# Patient Record
Sex: MALE | Race: ASIAN | NOT HISPANIC OR LATINO | URBAN - METROPOLITAN AREA
[De-identification: names, ages, dates, MRNs, and addresses within clinical notes are randomized per-mention and may not be internally consistent; named-entity substitution may affect disease eponyms.]

---

## 2017-01-24 PROBLEM — Z00.00 ENCOUNTER FOR PREVENTIVE HEALTH EXAMINATION: Status: ACTIVE | Noted: 2017-01-24

## 2017-01-25 ENCOUNTER — OUTPATIENT (OUTPATIENT)
Dept: OUTPATIENT SERVICES | Facility: HOSPITAL | Age: 77
LOS: 1 days | End: 2017-01-25
Payer: COMMERCIAL

## 2017-01-25 DIAGNOSIS — I25.10 ATHEROSCLEROTIC HEART DISEASE OF NATIVE CORONARY ARTERY WITHOUT ANGINA PECTORIS: ICD-10-CM

## 2017-01-25 PROCEDURE — A9502: CPT

## 2017-01-25 PROCEDURE — 93017 CV STRESS TEST TRACING ONLY: CPT

## 2017-01-25 PROCEDURE — 78452 HT MUSCLE IMAGE SPECT MULT: CPT

## 2017-07-30 ENCOUNTER — EMERGENCY (EMERGENCY)
Facility: HOSPITAL | Age: 77
LOS: 1 days | Discharge: HOME | End: 2017-07-30
Attending: EMERGENCY MEDICINE
Payer: COMMERCIAL

## 2017-07-30 VITALS
HEART RATE: 76 BPM | OXYGEN SATURATION: 99 % | TEMPERATURE: 97 F | DIASTOLIC BLOOD PRESSURE: 97 MMHG | SYSTOLIC BLOOD PRESSURE: 176 MMHG | HEIGHT: 67 IN | WEIGHT: 169.98 LBS | RESPIRATION RATE: 18 BRPM

## 2017-07-30 VITALS
SYSTOLIC BLOOD PRESSURE: 182 MMHG | HEART RATE: 62 BPM | RESPIRATION RATE: 18 BRPM | TEMPERATURE: 98 F | DIASTOLIC BLOOD PRESSURE: 75 MMHG | OXYGEN SATURATION: 97 %

## 2017-07-30 DIAGNOSIS — K43.9 VENTRAL HERNIA WITHOUT OBSTRUCTION OR GANGRENE: ICD-10-CM

## 2017-07-30 DIAGNOSIS — N18.9 CHRONIC KIDNEY DISEASE, UNSPECIFIED: ICD-10-CM

## 2017-07-30 DIAGNOSIS — E87.6 HYPOKALEMIA: ICD-10-CM

## 2017-07-30 DIAGNOSIS — M19.90 UNSPECIFIED OSTEOARTHRITIS, UNSPECIFIED SITE: ICD-10-CM

## 2017-07-30 DIAGNOSIS — Z98.61 CORONARY ANGIOPLASTY STATUS: ICD-10-CM

## 2017-07-30 DIAGNOSIS — I12.9 HYPERTENSIVE CHRONIC KIDNEY DISEASE WITH STAGE 1 THROUGH STAGE 4 CHRONIC KIDNEY DISEASE, OR UNSPECIFIED CHRONIC KIDNEY DISEASE: ICD-10-CM

## 2017-07-30 DIAGNOSIS — Z95.5 PRESENCE OF CORONARY ANGIOPLASTY IMPLANT AND GRAFT: Chronic | ICD-10-CM

## 2017-07-30 DIAGNOSIS — E78.00 PURE HYPERCHOLESTEROLEMIA, UNSPECIFIED: ICD-10-CM

## 2017-07-30 PROCEDURE — 93005 ELECTROCARDIOGRAM TRACING: CPT

## 2017-07-30 PROCEDURE — 70450 CT HEAD/BRAIN W/O DYE: CPT | Mod: 26

## 2017-07-30 PROCEDURE — 70450 CT HEAD/BRAIN W/O DYE: CPT

## 2017-07-30 PROCEDURE — 99284 EMERGENCY DEPT VISIT MOD MDM: CPT | Mod: 25

## 2017-07-30 PROCEDURE — 99285 EMERGENCY DEPT VISIT HI MDM: CPT

## 2017-07-30 RX ORDER — ACETAMINOPHEN 500 MG
975 TABLET ORAL ONCE
Qty: 0 | Refills: 0 | Status: COMPLETED | OUTPATIENT
Start: 2017-07-30 | End: 2017-07-30

## 2017-07-30 RX ADMIN — Medication 975 MILLIGRAM(S): at 15:28

## 2017-07-30 NOTE — ED PROVIDER NOTE - PMH
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

## 2017-07-30 NOTE — ED PROVIDER NOTE - OBJECTIVE STATEMENT
78 y/o M pt with PMHx of Chronic Hypokalemia, CKD, Osteoarthritis, HTN, Hypercholesterolemia, and Ventral Hernia and PSHx of Hemorrhoidectomy and Coronary Artery Stent Placement presents to ED c/p high blood pressure today. Pt also reports associated HA described as pain over L posterior head. Pt states having blood pressure of 175 (systolic) earlier today, followed by blood pressure of 220 (systolic) upon recheck several hours PTA in ED, prompting ED visit. Pt notes taking blood pressure medication today; pt is unaware of what blood pressure medication he takes. Pt denies CP, SOB, or any other complaints. Pt also denies Hx of DM. NKDA.

## 2017-07-30 NOTE — ED PROVIDER NOTE - MEDICAL DECISION MAKING DETAILS
78 y/o M pt presents with hypertension and L posterior head pain. Plan to give Tylenol, obtain CT Head, and d/c home with instructions for neurology and PMD f/u for further evaluation of chronic memory loss.

## 2018-03-05 ENCOUNTER — APPOINTMENT (OUTPATIENT)
Dept: HOME HEALTH SERVICES | Facility: HOME HEALTH | Age: 78
End: 2018-03-05
Payer: MEDICARE

## 2018-03-05 VITALS
RESPIRATION RATE: 16 BRPM | SYSTOLIC BLOOD PRESSURE: 110 MMHG | DIASTOLIC BLOOD PRESSURE: 70 MMHG | OXYGEN SATURATION: 97 % | HEART RATE: 75 BPM

## 2018-03-05 VITALS — BODY MASS INDEX: 26.68 KG/M2 | HEIGHT: 67 IN | WEIGHT: 170 LBS

## 2018-03-05 DIAGNOSIS — K43.9 VENTRAL HERNIA W/OUT OBSTRUCTION OR GANGRENE: ICD-10-CM

## 2018-03-05 DIAGNOSIS — M19.90 UNSPECIFIED OSTEOARTHRITIS, UNSPECIFIED SITE: ICD-10-CM

## 2018-03-05 DIAGNOSIS — Z78.9 OTHER SPECIFIED HEALTH STATUS: ICD-10-CM

## 2018-03-05 DIAGNOSIS — Z87.891 PERSONAL HISTORY OF NICOTINE DEPENDENCE: ICD-10-CM

## 2018-03-05 PROCEDURE — 99345 HOME/RES VST NEW HIGH MDM 75: CPT | Mod: 25

## 2018-03-05 PROCEDURE — G0506: CPT

## 2018-03-05 RX ORDER — HYDRALAZINE HYDROCHLORIDE 25 MG/1
25 TABLET ORAL
Qty: 90 | Refills: 0 | Status: COMPLETED | COMMUNITY
Start: 2017-12-01 | End: 2018-03-05

## 2018-03-05 RX ORDER — RIVASTIGMINE 4.6 MG/24H
4.6 PATCH, EXTENDED RELEASE TRANSDERMAL
Qty: 30 | Refills: 0 | Status: COMPLETED | COMMUNITY
Start: 2017-08-23 | End: 2018-03-05

## 2018-03-05 RX ORDER — RIVASTIGMINE 4.6 MG/24H
4.6 PATCH, EXTENDED RELEASE TRANSDERMAL
Refills: 0 | Status: ACTIVE | COMMUNITY
Start: 2018-03-05

## 2018-03-05 RX ORDER — HYDRALAZINE HYDROCHLORIDE 50 MG/1
50 TABLET ORAL
Qty: 90 | Refills: 0 | Status: COMPLETED | COMMUNITY
Start: 2017-08-03

## 2018-03-13 LAB
ALBUMIN SERPL ELPH-MCNC: 3.9 G/DL
ALP BLD-CCNC: 54 U/L
ALT SERPL-CCNC: 17 U/L
ANION GAP SERPL CALC-SCNC: 14 MMOL/L
AST SERPL-CCNC: 27 U/L
BASOPHILS # BLD AUTO: 0.04 K/UL
BASOPHILS NFR BLD AUTO: 0.6 %
BILIRUB SERPL-MCNC: 0.6 MG/DL
BUN SERPL-MCNC: 12 MG/DL
CALCIUM SERPL-MCNC: 9 MG/DL
CHLORIDE SERPL-SCNC: 104 MMOL/L
CHOLEST SERPL-MCNC: 218 MG/DL
CHOLEST/HDLC SERPL: 5.2 RATIO
CO2 SERPL-SCNC: 26 MMOL/L
CREAT SERPL-MCNC: 1.49 MG/DL
EOSINOPHIL # BLD AUTO: 0.2 K/UL
EOSINOPHIL NFR BLD AUTO: 2.8 %
HCT VFR BLD CALC: 44.3 %
HDLC SERPL-MCNC: 42 MG/DL
HGB BLD-MCNC: 14.8 G/DL
IMM GRANULOCYTES NFR BLD AUTO: 0.1 %
LDLC SERPL CALC-MCNC: 145 MG/DL
LYMPHOCYTES # BLD AUTO: 2.2 K/UL
LYMPHOCYTES NFR BLD AUTO: 31.1 %
MAN DIFF?: NORMAL
MCHC RBC-ENTMCNC: 32 PG
MCHC RBC-ENTMCNC: 33.4 GM/DL
MCV RBC AUTO: 95.7 FL
MONOCYTES # BLD AUTO: 0.66 K/UL
MONOCYTES NFR BLD AUTO: 9.3 %
NEUTROPHILS # BLD AUTO: 3.96 K/UL
NEUTROPHILS NFR BLD AUTO: 56.1 %
PLATELET # BLD AUTO: 226 K/UL
POTASSIUM SERPL-SCNC: 3.8 MMOL/L
PROT SERPL-MCNC: 7.4 G/DL
RBC # BLD: 4.63 M/UL
RBC # FLD: 12.6 %
SODIUM SERPL-SCNC: 144 MMOL/L
TRIGL SERPL-MCNC: 153 MG/DL
TSH SERPL-ACNC: 3.38 UIU/ML
WBC # FLD AUTO: 7.07 K/UL

## 2018-03-15 LAB — 25(OH)D3 SERPL-MCNC: 22.3 NG/ML

## 2018-04-09 ENCOUNTER — CHART COPY (OUTPATIENT)
Age: 78
End: 2018-04-09

## 2018-05-07 ENCOUNTER — APPOINTMENT (OUTPATIENT)
Dept: HOME HEALTH SERVICES | Facility: HOME HEALTH | Age: 78
End: 2018-05-07
Payer: MEDICARE

## 2018-05-07 VITALS
DIASTOLIC BLOOD PRESSURE: 70 MMHG | OXYGEN SATURATION: 92 % | RESPIRATION RATE: 16 BRPM | HEART RATE: 65 BPM | SYSTOLIC BLOOD PRESSURE: 150 MMHG

## 2018-05-07 DIAGNOSIS — N18.9 CHRONIC KIDNEY DISEASE, UNSPECIFIED: ICD-10-CM

## 2018-05-07 PROCEDURE — 99350 HOME/RES VST EST HIGH MDM 60: CPT

## 2018-05-20 ENCOUNTER — CLINICAL ADVICE (OUTPATIENT)
Age: 78
End: 2018-05-20

## 2018-05-20 DIAGNOSIS — M25.473 EFFUSION, UNSPECIFIED ANKLE: ICD-10-CM

## 2018-07-11 ENCOUNTER — INPATIENT (INPATIENT)
Facility: HOSPITAL | Age: 78
LOS: 3 days | Discharge: HOME | End: 2018-07-15
Attending: HOSPITALIST | Admitting: HOSPITALIST
Payer: MEDICARE

## 2018-07-11 VITALS
HEART RATE: 105 BPM | DIASTOLIC BLOOD PRESSURE: 87 MMHG | SYSTOLIC BLOOD PRESSURE: 150 MMHG | RESPIRATION RATE: 18 BRPM | OXYGEN SATURATION: 96 % | TEMPERATURE: 101 F

## 2018-07-11 DIAGNOSIS — Z95.5 PRESENCE OF CORONARY ANGIOPLASTY IMPLANT AND GRAFT: Chronic | ICD-10-CM

## 2018-07-11 LAB
ALBUMIN SERPL ELPH-MCNC: 4 G/DL — SIGNIFICANT CHANGE UP (ref 3.3–5)
ALP SERPL-CCNC: 78 U/L — SIGNIFICANT CHANGE UP (ref 40–120)
ALT FLD-CCNC: 78 U/L — HIGH (ref 4–41)
ANISOCYTOSIS BLD QL: SLIGHT — SIGNIFICANT CHANGE UP
APPEARANCE UR: CLEAR — SIGNIFICANT CHANGE UP
AST SERPL-CCNC: 91 U/L — HIGH (ref 4–40)
BASE EXCESS BLDV CALC-SCNC: 3.9 MMOL/L — SIGNIFICANT CHANGE UP
BASOPHILS # BLD AUTO: 0.05 K/UL — SIGNIFICANT CHANGE UP (ref 0–0.2)
BASOPHILS NFR BLD AUTO: 0.3 % — SIGNIFICANT CHANGE UP (ref 0–2)
BASOPHILS NFR SPEC: 0 % — SIGNIFICANT CHANGE UP (ref 0–2)
BILIRUB SERPL-MCNC: 1.6 MG/DL — HIGH (ref 0.2–1.2)
BILIRUB UR-MCNC: NEGATIVE — SIGNIFICANT CHANGE UP
BLASTS # FLD: 0 % — SIGNIFICANT CHANGE UP (ref 0–0)
BLOOD GAS VENOUS - CREATININE: 2.29 MG/DL — HIGH (ref 0.5–1.3)
BLOOD UR QL VISUAL: HIGH
BUN SERPL-MCNC: 17 MG/DL — SIGNIFICANT CHANGE UP (ref 7–23)
CALCIUM SERPL-MCNC: 8.8 MG/DL — SIGNIFICANT CHANGE UP (ref 8.4–10.5)
CHLORIDE BLDV-SCNC: 104 MMOL/L — SIGNIFICANT CHANGE UP (ref 96–108)
CHLORIDE SERPL-SCNC: 100 MMOL/L — SIGNIFICANT CHANGE UP (ref 98–107)
CO2 SERPL-SCNC: 26 MMOL/L — SIGNIFICANT CHANGE UP (ref 22–31)
COLOR SPEC: YELLOW — SIGNIFICANT CHANGE UP
CREAT SERPL-MCNC: 2.21 MG/DL — HIGH (ref 0.5–1.3)
EOSINOPHIL # BLD AUTO: 0 K/UL — SIGNIFICANT CHANGE UP (ref 0–0.5)
EOSINOPHIL NFR BLD AUTO: 0 % — SIGNIFICANT CHANGE UP (ref 0–6)
EOSINOPHIL NFR FLD: 0 % — SIGNIFICANT CHANGE UP (ref 0–6)
GAS PNL BLDV: 136 MMOL/L — SIGNIFICANT CHANGE UP (ref 136–146)
GLUCOSE BLDV-MCNC: 135 — HIGH (ref 70–99)
GLUCOSE SERPL-MCNC: 131 MG/DL — HIGH (ref 70–99)
GLUCOSE UR-MCNC: NEGATIVE — SIGNIFICANT CHANGE UP
HCO3 BLDV-SCNC: 26 MMOL/L — SIGNIFICANT CHANGE UP (ref 20–27)
HCT VFR BLD CALC: 41.7 % — SIGNIFICANT CHANGE UP (ref 39–50)
HCT VFR BLDV CALC: 46.8 % — SIGNIFICANT CHANGE UP (ref 39–51)
HGB BLD-MCNC: 14.6 G/DL — SIGNIFICANT CHANGE UP (ref 13–17)
HGB BLDV-MCNC: 15.3 G/DL — SIGNIFICANT CHANGE UP (ref 13–17)
IMM GRANULOCYTES # BLD AUTO: 0.12 # — SIGNIFICANT CHANGE UP
IMM GRANULOCYTES NFR BLD AUTO: 0.8 % — SIGNIFICANT CHANGE UP (ref 0–1.5)
KETONES UR-MCNC: NEGATIVE — SIGNIFICANT CHANGE UP
LACTATE BLDV-MCNC: 1.9 MMOL/L — SIGNIFICANT CHANGE UP (ref 0.5–2)
LEUKOCYTE ESTERASE UR-ACNC: NEGATIVE — SIGNIFICANT CHANGE UP
LYMPHOCYTES # BLD AUTO: 1.7 K/UL — SIGNIFICANT CHANGE UP (ref 1–3.3)
LYMPHOCYTES # BLD AUTO: 11.8 % — LOW (ref 13–44)
LYMPHOCYTES NFR SPEC AUTO: 12.4 % — LOW (ref 13–44)
MCHC RBC-ENTMCNC: 30.8 PG — SIGNIFICANT CHANGE UP (ref 27–34)
MCHC RBC-ENTMCNC: 35 % — SIGNIFICANT CHANGE UP (ref 32–36)
MCV RBC AUTO: 88 FL — SIGNIFICANT CHANGE UP (ref 80–100)
METAMYELOCYTES # FLD: 0 % — SIGNIFICANT CHANGE UP (ref 0–1)
MICROCYTES BLD QL: SLIGHT — SIGNIFICANT CHANGE UP
MONOCYTES # BLD AUTO: 0.95 K/UL — HIGH (ref 0–0.9)
MONOCYTES NFR BLD AUTO: 6.6 % — SIGNIFICANT CHANGE UP (ref 2–14)
MONOCYTES NFR BLD: 1.8 % — LOW (ref 2–9)
MUCOUS THREADS # UR AUTO: SIGNIFICANT CHANGE UP
MYELOCYTES NFR BLD: 0 % — SIGNIFICANT CHANGE UP (ref 0–0)
NEUTROPHIL AB SER-ACNC: 75.2 % — SIGNIFICANT CHANGE UP (ref 43–77)
NEUTROPHILS # BLD AUTO: 11.55 K/UL — HIGH (ref 1.8–7.4)
NEUTROPHILS NFR BLD AUTO: 80.5 % — HIGH (ref 43–77)
NEUTS BAND # BLD: 7.1 % — HIGH (ref 0–6)
NITRITE UR-MCNC: NEGATIVE — SIGNIFICANT CHANGE UP
NRBC # FLD: 0 — SIGNIFICANT CHANGE UP
OTHER - HEMATOLOGY %: 0 — SIGNIFICANT CHANGE UP
OVALOCYTES BLD QL SMEAR: SLIGHT — SIGNIFICANT CHANGE UP
PCO2 BLDV: 44 MMHG — SIGNIFICANT CHANGE UP (ref 41–51)
PH BLDV: 7.42 PH — SIGNIFICANT CHANGE UP (ref 7.32–7.43)
PH UR: 7.5 — SIGNIFICANT CHANGE UP (ref 4.6–8)
PLATELET # BLD AUTO: 186 K/UL — SIGNIFICANT CHANGE UP (ref 150–400)
PLATELET COUNT - ESTIMATE: NORMAL — SIGNIFICANT CHANGE UP
PMV BLD: 9 FL — SIGNIFICANT CHANGE UP (ref 7–13)
PO2 BLDV: 26 MMHG — LOW (ref 35–40)
POIKILOCYTOSIS BLD QL AUTO: SLIGHT — SIGNIFICANT CHANGE UP
POTASSIUM BLDV-SCNC: 3.2 MMOL/L — LOW (ref 3.4–4.5)
POTASSIUM SERPL-MCNC: 3.3 MMOL/L — LOW (ref 3.5–5.3)
POTASSIUM SERPL-SCNC: 3.3 MMOL/L — LOW (ref 3.5–5.3)
PROMYELOCYTES # FLD: 0 % — SIGNIFICANT CHANGE UP (ref 0–0)
PROT SERPL-MCNC: 8.3 G/DL — SIGNIFICANT CHANGE UP (ref 6–8.3)
PROT UR-MCNC: 100 MG/DL — SIGNIFICANT CHANGE UP
RBC # BLD: 4.74 M/UL — SIGNIFICANT CHANGE UP (ref 4.2–5.8)
RBC # FLD: 12.4 % — SIGNIFICANT CHANGE UP (ref 10.3–14.5)
RBC CASTS # UR COMP ASSIST: HIGH (ref 0–?)
SAO2 % BLDV: 47.2 % — LOW (ref 60–85)
SODIUM SERPL-SCNC: 140 MMOL/L — SIGNIFICANT CHANGE UP (ref 135–145)
SP GR SPEC: 1.01 — SIGNIFICANT CHANGE UP (ref 1–1.04)
UROBILINOGEN FLD QL: NORMAL MG/DL — SIGNIFICANT CHANGE UP
VARIANT LYMPHS # BLD: 3.5 % — SIGNIFICANT CHANGE UP
WBC # BLD: 14.37 K/UL — HIGH (ref 3.8–10.5)
WBC # FLD AUTO: 14.37 K/UL — HIGH (ref 3.8–10.5)
WBC UR QL: SIGNIFICANT CHANGE UP (ref 0–?)

## 2018-07-11 PROCEDURE — 93971 EXTREMITY STUDY: CPT | Mod: 26,LT

## 2018-07-11 RX ORDER — VANCOMYCIN HCL 1 G
1000 VIAL (EA) INTRAVENOUS ONCE
Qty: 0 | Refills: 0 | Status: COMPLETED | OUTPATIENT
Start: 2018-07-11 | End: 2018-07-12

## 2018-07-11 RX ORDER — SODIUM CHLORIDE 9 MG/ML
1000 INJECTION INTRAMUSCULAR; INTRAVENOUS; SUBCUTANEOUS ONCE
Qty: 0 | Refills: 0 | Status: COMPLETED | OUTPATIENT
Start: 2018-07-11 | End: 2018-07-11

## 2018-07-11 RX ORDER — PIPERACILLIN AND TAZOBACTAM 4; .5 G/20ML; G/20ML
3.38 INJECTION, POWDER, LYOPHILIZED, FOR SOLUTION INTRAVENOUS ONCE
Qty: 0 | Refills: 0 | Status: COMPLETED | OUTPATIENT
Start: 2018-07-11 | End: 2018-07-11

## 2018-07-11 RX ORDER — ACETAMINOPHEN 500 MG
975 TABLET ORAL ONCE
Qty: 0 | Refills: 0 | Status: COMPLETED | OUTPATIENT
Start: 2018-07-11 | End: 2018-07-11

## 2018-07-11 RX ADMIN — PIPERACILLIN AND TAZOBACTAM 200 GRAM(S): 4; .5 INJECTION, POWDER, LYOPHILIZED, FOR SOLUTION INTRAVENOUS at 22:06

## 2018-07-11 RX ADMIN — SODIUM CHLORIDE 1000 MILLILITER(S): 9 INJECTION INTRAMUSCULAR; INTRAVENOUS; SUBCUTANEOUS at 21:52

## 2018-07-11 RX ADMIN — Medication 975 MILLIGRAM(S): at 22:07

## 2018-07-11 NOTE — ED ADULT TRIAGE NOTE - CHIEF COMPLAINT QUOTE
Pt was brought in by family for co weakness fever and chills that developed last night. pts family also report pt unable o hold urine. Pt was brought in by family for co weakness fever and chills that developed last night. pts family also report pt unable o hold urine, pt urinated on himself in triage.

## 2018-07-11 NOTE — ED CLERICAL - NS ED CLERK NOTE PRE-ARRIVAL INFORMATION; ADDITIONAL PRE-ARRIVAL INFORMATION

## 2018-07-11 NOTE — ED ADULT NURSE NOTE - CHIEF COMPLAINT QUOTE
Pt was brought in by family for co weakness fever and chills that developed last night. pts family also report pt unable o hold urine, pt urinated on himself in triage.

## 2018-07-11 NOTE — ED ADULT NURSE NOTE - OBJECTIVE STATEMENT
Patience RN: pt received to the ed w/hx of alzheimer's, and 3 stents presents w/ c/o fever and chills that began earlier today. pt normally a&ox2 at baseline to time and person, skin intact, respirations even and unlabored, abd soft and non-distended non tender to palpation. as per pt granddaughter, pt has become unsteady on feet recently due to new onset weakness. pt rt leg swollen compared to left-denies being trauma induced. as per granddaughter and daughter at bedside, pt has become incontinent-urinating more frequently. 20 gauge placed in left arm, labs drawn and sent, pt nsr on cardiac monitor, will report to primary RN Opal.

## 2018-07-12 ENCOUNTER — APPOINTMENT (OUTPATIENT)
Dept: HOME HEALTH SERVICES | Facility: HOME HEALTH | Age: 78
End: 2018-07-12

## 2018-07-12 DIAGNOSIS — E78.5 HYPERLIPIDEMIA, UNSPECIFIED: ICD-10-CM

## 2018-07-12 DIAGNOSIS — J18.9 PNEUMONIA, UNSPECIFIED ORGANISM: ICD-10-CM

## 2018-07-12 DIAGNOSIS — A41.9 SEPSIS, UNSPECIFIED ORGANISM: ICD-10-CM

## 2018-07-12 DIAGNOSIS — G93.41 METABOLIC ENCEPHALOPATHY: ICD-10-CM

## 2018-07-12 DIAGNOSIS — I25.10 ATHEROSCLEROTIC HEART DISEASE OF NATIVE CORONARY ARTERY WITHOUT ANGINA PECTORIS: ICD-10-CM

## 2018-07-12 DIAGNOSIS — N17.8 OTHER ACUTE KIDNEY FAILURE: ICD-10-CM

## 2018-07-12 DIAGNOSIS — R94.5 ABNORMAL RESULTS OF LIVER FUNCTION STUDIES: ICD-10-CM

## 2018-07-12 DIAGNOSIS — L03.90 CELLULITIS, UNSPECIFIED: ICD-10-CM

## 2018-07-12 DIAGNOSIS — N18.9 CHRONIC KIDNEY DISEASE, UNSPECIFIED: ICD-10-CM

## 2018-07-12 DIAGNOSIS — I10 ESSENTIAL (PRIMARY) HYPERTENSION: ICD-10-CM

## 2018-07-12 DIAGNOSIS — Z29.9 ENCOUNTER FOR PROPHYLACTIC MEASURES, UNSPECIFIED: ICD-10-CM

## 2018-07-12 LAB
ALBUMIN SERPL ELPH-MCNC: 3.7 G/DL — SIGNIFICANT CHANGE UP (ref 3.3–5)
ALP SERPL-CCNC: 80 U/L — SIGNIFICANT CHANGE UP (ref 40–120)
ALT FLD-CCNC: 65 U/L — HIGH (ref 4–41)
AST SERPL-CCNC: 68 U/L — HIGH (ref 4–40)
BASOPHILS # BLD AUTO: 0.04 K/UL — SIGNIFICANT CHANGE UP (ref 0–0.2)
BASOPHILS NFR BLD AUTO: 0.3 % — SIGNIFICANT CHANGE UP (ref 0–2)
BILIRUB DIRECT SERPL-MCNC: 0.5 MG/DL — HIGH (ref 0.1–0.2)
BILIRUB SERPL-MCNC: 1.3 MG/DL — HIGH (ref 0.2–1.2)
BUN SERPL-MCNC: 16 MG/DL — SIGNIFICANT CHANGE UP (ref 7–23)
CALCIUM SERPL-MCNC: 8.4 MG/DL — SIGNIFICANT CHANGE UP (ref 8.4–10.5)
CHLORIDE SERPL-SCNC: 100 MMOL/L — SIGNIFICANT CHANGE UP (ref 98–107)
CO2 SERPL-SCNC: 27 MMOL/L — SIGNIFICANT CHANGE UP (ref 22–31)
CREAT SERPL-MCNC: 1.99 MG/DL — HIGH (ref 0.5–1.3)
EOSINOPHIL # BLD AUTO: 0 K/UL — SIGNIFICANT CHANGE UP (ref 0–0.5)
EOSINOPHIL NFR BLD AUTO: 0 % — SIGNIFICANT CHANGE UP (ref 0–6)
GLUCOSE SERPL-MCNC: 116 MG/DL — HIGH (ref 70–99)
HCT VFR BLD CALC: 42.7 % — SIGNIFICANT CHANGE UP (ref 39–50)
HGB BLD-MCNC: 14.7 G/DL — SIGNIFICANT CHANGE UP (ref 13–17)
IMM GRANULOCYTES # BLD AUTO: 0.12 # — SIGNIFICANT CHANGE UP
IMM GRANULOCYTES NFR BLD AUTO: 0.9 % — SIGNIFICANT CHANGE UP (ref 0–1.5)
LYMPHOCYTES # BLD AUTO: 1.5 K/UL — SIGNIFICANT CHANGE UP (ref 1–3.3)
LYMPHOCYTES # BLD AUTO: 10.9 % — LOW (ref 13–44)
MAGNESIUM SERPL-MCNC: 2 MG/DL — SIGNIFICANT CHANGE UP (ref 1.6–2.6)
MCHC RBC-ENTMCNC: 31.9 PG — SIGNIFICANT CHANGE UP (ref 27–34)
MCHC RBC-ENTMCNC: 34.4 % — SIGNIFICANT CHANGE UP (ref 32–36)
MCV RBC AUTO: 92.6 FL — SIGNIFICANT CHANGE UP (ref 80–100)
MONOCYTES # BLD AUTO: 0.72 K/UL — SIGNIFICANT CHANGE UP (ref 0–0.9)
MONOCYTES NFR BLD AUTO: 5.2 % — SIGNIFICANT CHANGE UP (ref 2–14)
NEUTROPHILS # BLD AUTO: 11.4 K/UL — HIGH (ref 1.8–7.4)
NEUTROPHILS NFR BLD AUTO: 82.7 % — HIGH (ref 43–77)
NRBC # FLD: 0 — SIGNIFICANT CHANGE UP
PHOSPHATE SERPL-MCNC: 2.3 MG/DL — LOW (ref 2.5–4.5)
PLATELET # BLD AUTO: 180 K/UL — SIGNIFICANT CHANGE UP (ref 150–400)
PMV BLD: 9.5 FL — SIGNIFICANT CHANGE UP (ref 7–13)
POTASSIUM SERPL-MCNC: 3.2 MMOL/L — LOW (ref 3.5–5.3)
POTASSIUM SERPL-SCNC: 3.2 MMOL/L — LOW (ref 3.5–5.3)
PROT SERPL-MCNC: 7.9 G/DL — SIGNIFICANT CHANGE UP (ref 6–8.3)
RBC # BLD: 4.61 M/UL — SIGNIFICANT CHANGE UP (ref 4.2–5.8)
RBC # FLD: 12.9 % — SIGNIFICANT CHANGE UP (ref 10.3–14.5)
SODIUM SERPL-SCNC: 139 MMOL/L — SIGNIFICANT CHANGE UP (ref 135–145)
SPECIMEN SOURCE: SIGNIFICANT CHANGE UP
SPECIMEN SOURCE: SIGNIFICANT CHANGE UP
WBC # BLD: 13.78 K/UL — HIGH (ref 3.8–10.5)
WBC # FLD AUTO: 13.78 K/UL — HIGH (ref 3.8–10.5)

## 2018-07-12 PROCEDURE — 12345: CPT | Mod: NC

## 2018-07-12 PROCEDURE — 71046 X-RAY EXAM CHEST 2 VIEWS: CPT | Mod: 26

## 2018-07-12 PROCEDURE — 99223 1ST HOSP IP/OBS HIGH 75: CPT

## 2018-07-12 RX ORDER — ASPIRIN/CALCIUM CARB/MAGNESIUM 324 MG
81 TABLET ORAL DAILY
Qty: 0 | Refills: 0 | Status: DISCONTINUED | OUTPATIENT
Start: 2018-07-12 | End: 2018-07-15

## 2018-07-12 RX ORDER — SODIUM CHLORIDE 9 MG/ML
1000 INJECTION INTRAMUSCULAR; INTRAVENOUS; SUBCUTANEOUS
Qty: 0 | Refills: 0 | Status: DISCONTINUED | OUTPATIENT
Start: 2018-07-12 | End: 2018-07-15

## 2018-07-12 RX ORDER — POTASSIUM CHLORIDE 20 MEQ
20 PACKET (EA) ORAL ONCE
Qty: 0 | Refills: 0 | Status: COMPLETED | OUTPATIENT
Start: 2018-07-12 | End: 2018-07-12

## 2018-07-12 RX ORDER — SODIUM CHLORIDE 0.65 %
1 AEROSOL, SPRAY (ML) NASAL EVERY 4 HOURS
Qty: 0 | Refills: 0 | Status: DISCONTINUED | OUTPATIENT
Start: 2018-07-12 | End: 2018-07-15

## 2018-07-12 RX ORDER — SODIUM,POTASSIUM PHOSPHATES 278-250MG
1 POWDER IN PACKET (EA) ORAL
Qty: 0 | Refills: 0 | Status: COMPLETED | OUTPATIENT
Start: 2018-07-12 | End: 2018-07-13

## 2018-07-12 RX ORDER — HEPARIN SODIUM 5000 [USP'U]/ML
5000 INJECTION INTRAVENOUS; SUBCUTANEOUS EVERY 8 HOURS
Qty: 0 | Refills: 0 | Status: DISCONTINUED | OUTPATIENT
Start: 2018-07-12 | End: 2018-07-15

## 2018-07-12 RX ORDER — PIPERACILLIN AND TAZOBACTAM 4; .5 G/20ML; G/20ML
3.38 INJECTION, POWDER, LYOPHILIZED, FOR SOLUTION INTRAVENOUS EVERY 8 HOURS
Qty: 0 | Refills: 0 | Status: DISCONTINUED | OUTPATIENT
Start: 2018-07-12 | End: 2018-07-12

## 2018-07-12 RX ORDER — ASPIRIN/CALCIUM CARB/MAGNESIUM 324 MG
81 TABLET ORAL DAILY
Qty: 0 | Refills: 0 | Status: DISCONTINUED | OUTPATIENT
Start: 2018-07-12 | End: 2018-07-12

## 2018-07-12 RX ORDER — ATORVASTATIN CALCIUM 80 MG/1
40 TABLET, FILM COATED ORAL AT BEDTIME
Qty: 0 | Refills: 0 | Status: DISCONTINUED | OUTPATIENT
Start: 2018-07-12 | End: 2018-07-15

## 2018-07-12 RX ORDER — ACETAMINOPHEN 500 MG
650 TABLET ORAL EVERY 6 HOURS
Qty: 0 | Refills: 0 | Status: DISCONTINUED | OUTPATIENT
Start: 2018-07-12 | End: 2018-07-15

## 2018-07-12 RX ORDER — PIPERACILLIN AND TAZOBACTAM 4; .5 G/20ML; G/20ML
3.38 INJECTION, POWDER, LYOPHILIZED, FOR SOLUTION INTRAVENOUS EVERY 12 HOURS
Qty: 0 | Refills: 0 | Status: DISCONTINUED | OUTPATIENT
Start: 2018-07-12 | End: 2018-07-13

## 2018-07-12 RX ADMIN — PIPERACILLIN AND TAZOBACTAM 25 GRAM(S): 4; .5 INJECTION, POWDER, LYOPHILIZED, FOR SOLUTION INTRAVENOUS at 17:38

## 2018-07-12 RX ADMIN — Medication 20 MILLIEQUIVALENT(S): at 07:56

## 2018-07-12 RX ADMIN — Medication 1 TABLET(S): at 12:15

## 2018-07-12 RX ADMIN — Medication 1 TABLET(S): at 22:20

## 2018-07-12 RX ADMIN — SODIUM CHLORIDE 50 MILLILITER(S): 9 INJECTION INTRAMUSCULAR; INTRAVENOUS; SUBCUTANEOUS at 08:07

## 2018-07-12 RX ADMIN — Medication 1 TABLET(S): at 16:48

## 2018-07-12 RX ADMIN — Medication 250 MILLIGRAM(S): at 00:54

## 2018-07-12 RX ADMIN — HEPARIN SODIUM 5000 UNIT(S): 5000 INJECTION INTRAVENOUS; SUBCUTANEOUS at 22:20

## 2018-07-12 RX ADMIN — Medication 81 MILLIGRAM(S): at 14:17

## 2018-07-12 RX ADMIN — Medication 1 SPRAY(S): at 16:48

## 2018-07-12 RX ADMIN — Medication 20 MILLIEQUIVALENT(S): at 11:17

## 2018-07-12 RX ADMIN — ATORVASTATIN CALCIUM 40 MILLIGRAM(S): 80 TABLET, FILM COATED ORAL at 22:20

## 2018-07-12 NOTE — H&P ADULT - PROBLEM SELECTOR PLAN 4
AST and ALT mildly elevated to ~2x ULN and t bili elevated to 1.6. Alk phos, however, wnl. Unclear etiology. No complaints of abdominal pain and abdominal exam benign.   - Trend LFTs for now. Obtain direct bili vs. indirect bili levels.   - Consider RUQ U/S if LFTs continue to uptrend

## 2018-07-12 NOTE — H&P ADULT - NSHPSOCIALHISTORY_GEN_ALL_CORE
Tobacco: no hx  EtOH: occasional  Illicit drugs: no hx  Lives with wife. Pt independent with most ADLs, ambulates without assistance.

## 2018-07-12 NOTE — ED PROVIDER NOTE - OBJECTIVE STATEMENT
78M w/ pmh CKD, HTN, HLD, CAD s/p stent, dementia p/w weakness, chills since last night; unable to get out of bed. +urinary frequency x 1 day, also RLE swelling and erythema. No cough, sob, dysuria. No recent illness, med change.     FAmily at bedside providing story, pt has hx dementia    PCP: Stacey Corona

## 2018-07-12 NOTE — H&P ADULT - PROBLEM SELECTOR PLAN 8
Cr elevated to 2.21. Unclear if purely CKD or VITOR on CKD. No recent Cr values.  - F/u AM BMP s/p 1L bolus of NS   - Will do gentle IVF with NS at 50 cc/hr x 10 hrs  - Trend Cr and monitor UOP. Avoid NSAIDs, contrast, nephrotoxins. Renally dose meds.

## 2018-07-12 NOTE — H&P ADULT - PROBLEM SELECTOR PLAN 6
BPs in acceptable range.  - Will hold home BP meds (lisinopril 40 mg daily and lasix 20 mg daily) in setting of sepsis and possibly VITOR. Will resume home BP meds as tolerated.

## 2018-07-12 NOTE — H&P ADULT - ASSESSMENT
77 yo man with h/o CAD s/p stents (last one in 2017), HTN, HLD, CKD (unclear Cr baseline), and Alzheimer's dementia (independent with most ADLs but usually not oriented to place) presents with shaking/chills and weakness that started Tuesday night and 1 week of R leg swelling with redness and warmth, admitted with sepsis likely 2/2 multifocal PNA and cellulitis.

## 2018-07-12 NOTE — ED PROVIDER NOTE - PHYSICAL EXAMINATION
*GEN:   comfortable, in no acute distress, AOx3    ///    *EYES:   pupils equally round and reactive to light, extra-occular movements intact    ///    *HEENT:   airway patent, moist mucosal membranes    ///    *CV:   regular rate and rhythm    ///    *RESP:   clear to auscultation bilaterally, non-labored    ///    *ABD:   soft, non-tender    ///    *:   no cva/flank tenderness    ///    *MSK:   no MSK tenderness or limited ROM    ///    *SKIN:   RLE anterolateral calf 5x5cm erythema, minimal tenderness to palpation    ///    *NEURO:   AOx3, no focal weakness or loss of sensation

## 2018-07-12 NOTE — H&P ADULT - PROBLEM SELECTOR PLAN 5
- Unsure if pt is on ASA. Will need to verify with other family members.  - C/w atorvastatin 40 mg qhs

## 2018-07-12 NOTE — H&P ADULT - HISTORY OF PRESENT ILLNESS
77 yo man with h/o CAD s/p stents CAD s/p stents (last one in 2017), HTN, HLD, and Alzheimer's dementia (independent with most ADLs but usually not oriented to place) presents with shaking/chills and weakness that started Tuesday night and 1 week of R leg swelling with redness. HPI obtained from pt's granddaughter, who was at bedside. According to pt's granddaughter, pt had acute onset of dyspnea with shaking/chills on Tuesday night. The following morning, pt was too weak to get out of bed. Pt slept nearly the entire day and had no desire to eat. Pt also very felt hot to touch, though no temp was taken at home. No complaints of CP, cough, wheezing, N/V, diarrhea, abdominal pain, flank pain, or urinary symptoms. Pt had a stuffy nose for a few days leading up to the shaking/chills. Pt's wife was recently sick with PNA. Pt's granddaughter also states that pt has been having 1 week of worsening R leg swelling with redness and warmth. The swelling and redness extends from the distal 1/3 of the shin to the R foot. No history of trauma or bug bites to the area.     In the ED, VS: 101.2 oral, 105, 150/87, 18, 96% RA. CBC significant for leukocytosis of 14.37 with 7.1% bands. Pt was given Vanc and Xosyn by the ED. CXR showed bilateral lower lobe perihilar opacities, possible multifocal PNA. 79 yo man with h/o CAD s/p stents (last one in 2017), HTN, HLD, CKD (unclear Cr baseline), and Alzheimer's dementia (independent with most ADLs but usually not oriented to place) presents with shaking/chills and weakness that started Tuesday night and 1 week of R leg swelling with redness and warmth. HPI obtained from pt's granddaughter, who was at bedside. According to pt's granddaughter, pt had acute onset of dyspnea with shaking/chills on Tuesday night. The following morning, pt was too weak to get out of bed. Pt slept nearly the entire day and had no desire to eat. Pt also very felt hot to touch, though no temp was taken at home. No complaints of CP, cough, wheezing, N/V, diarrhea, abdominal pain, flank pain, or urinary symptoms. Pt had a stuffy nose for a few days leading up to the shaking/chills. Pt's wife was recently sick and hospitalized with PNA. Pt's granddaughter also states that pt has been having 1 week of worsening R leg swelling with redness and warmth. The swelling and redness extends from the distal 1/3 of the shin to the R foot. No history of trauma or bug bites to the area.     In the ED, VS: 101.2 oral, 105, 150/87, 18, 96% RA. CBC significant for leukocytosis of 14.37 with 7.1% bands. Pt was given Vanc and Xosyn by the ED. CXR showed bilateral lower lobe perihilar opacities, possible multifocal PNA.

## 2018-07-12 NOTE — ED PROVIDER NOTE - ATTENDING CONTRIBUTION TO CARE
Dr. Cervantes: 79 yo male with CKD, HTN, HLD, CAD with stent, dementia (family at bedside providing HPI), in ED with generalized fatigue and chills beginning last night.  Also 1 day of urinary frequency.  No cough, N/V/D or abdominal pain.  On exam pt chronically-ill appearing but in NAD, heart RRR, lungs CTAB, abd NTND, strength 5/5 in all extremities and skin without rash.  Right LE distal moderately swollen and warm with mild associated erythema, appears to be early cellulitis.

## 2018-07-12 NOTE — H&P ADULT - NSHPPHYSICALEXAM_GEN_ALL_CORE
Vital Signs Last 24 Hrs  T(C): 37.2 (12 Jul 2018 05:36), Max: 38.4 (11 Jul 2018 19:25)  T(F): 98.9 (12 Jul 2018 05:36), Max: 101.2 (11 Jul 2018 19:25)  HR: 56 (12 Jul 2018 05:36) (56 - 105)  BP: 122/61 (12 Jul 2018 05:36) (113/76 - 150/87)  BP(mean): --  RR: 18 (12 Jul 2018 05:36) (17 - 21)  SpO2: 95% (12 Jul 2018 05:36) (95% - 98%)    PHYSICAL EXAM:  General: No acute distress. Sleepy but easily arousable.   HEENT: Normocephalic, atraumatic.  PERRL.  EOMI.  No scleral icterus.  Moist MM.  No oropharyngeal exudates.    Neck: Supple.  Full range of motion.  No JVD.  No thyromegaly.  Trachea midline.  No lymphadenopathy.   Heart: RRR.  Normal S1 and S2.   Lungs: CTAB. Diminished breath sounds at R lung base.   Abdomen: BS+, soft, NT/ND.  Skin: RLE with erythema and slight edema and warmth from distal 1/3 of shin to proximal R foot  Extremities: No edema, clubbing, or cyanosis.  2+ peripheral pulses b/l.  Neuro: A&Ox2 (not to time).  5/5 strength in UE and LE b/l.  Tactile sensation intact in UE and LE b/l.  No focal deficits. Vital Signs Last 24 Hrs  T(C): 37.2 (12 Jul 2018 05:36), Max: 38.4 (11 Jul 2018 19:25)  T(F): 98.9 (12 Jul 2018 05:36), Max: 101.2 (11 Jul 2018 19:25)  HR: 56 (12 Jul 2018 05:36) (56 - 105)  BP: 122/61 (12 Jul 2018 05:36) (113/76 - 150/87)  BP(mean): --  RR: 18 (12 Jul 2018 05:36) (17 - 21)  SpO2: 95% (12 Jul 2018 05:36) (95% - 98%)    PHYSICAL EXAM:  General: No acute distress. Sleepy but easily arousable.   HEENT: Normocephalic, atraumatic.  PERRL.  EOMI.  No scleral icterus.  Moist MM.  No oropharyngeal exudates.    Neck: Supple.  Full range of motion.  No JVD.  No thyromegaly.  Trachea midline.  No lymphadenopathy.   Heart: RRR.  Normal S1 and S2.   Lungs: CTAB. Diminished breath sounds at R lung base.   Abdomen: BS+, soft, NT/ND.  Skin: RLE with erythema and slight edema and warmth from distal 1/3 of shin to proximal R foot (erythema much improved over the past 3 hours while in the ED per pt's granddaughter).  Extremities: No edema, clubbing, or cyanosis.  2+ peripheral pulses b/l.  Neuro: A&Ox2 (not to time).  5/5 strength in UE and LE b/l.  Tactile sensation intact in UE and LE b/l.  No focal deficits.

## 2018-07-12 NOTE — H&P ADULT - PROBLEM SELECTOR PLAN 2
CXR with bilateral lower lobe perihilar opacities, possibly multifocal PNA.  - Given that pt's wife was recently sick and hospitalized with PNA, will c/w broad-spectrum antibiotics with Vanc and Zosyn. However, will dose Vanc by level for now given uncertainty of kidney function (VITOR vs. CKD), also renally dose Zosyn. CXR with bilateral lower lobe perihilar opacities, possibly multifocal PNA.  - Given that pt's wife was recently sick and hospitalized with PNA, will will c/w broad-spectrum antibiotics with Vanc and Zosyn, renally dosed.

## 2018-07-12 NOTE — H&P ADULT - PROBLEM SELECTOR PLAN 1
Pt met SIRS criteria with T 101.2F oral, tachycardia to 105, and WBC 14.37 with 7% bands. CXR with bilateral lower lobe perihilar opacities, possibly multifocal PNA. RLE with erythema, edema, and warmth, with erythema improving s/p antibiotics in the ED. Sepsis likely 2/2 multifocal PNA and cellulitis.  - Given that pt's wife was recently sick and hospitalized with PNA and given that pt's cellulitis noticeably improved following administration of Vanc and Zosyn, will c/w broad-spectrum antibiotics with Vanc and Zosyn. However, will dose Vanc by level for now given uncertainty of kidney function (VITOR vs. CKD), also renally dose Zosyn.   - F/u blood cxs and urine cx  - S/p 1L bolus of NS in the ED  - Will do gentle IVF with NS at 50 cc/hr x 10 hrs Pt met SIRS criteria with T 101.2F oral, tachycardia to 105, and WBC 14.37 with 7% bands. CXR with bilateral lower lobe perihilar opacities, possibly multifocal PNA. RLE with erythema, edema, and warmth, with erythema improving s/p antibiotics in the ED. Sepsis likely 2/2 multifocal PNA and cellulitis.  - Given that pt's wife was recently sick and hospitalized with PNA and given that pt's cellulitis noticeably improved following administration of Vanc and Zosyn, will c/w broad-spectrum antibiotics with Vanc and Zosyn, renally dosed.   - F/u blood cxs and urine cx  - S/p 1L bolus of NS in the ED  - Will do gentle IVF with NS at 50 cc/hr x 10 hrs

## 2018-07-12 NOTE — PROGRESS NOTE ADULT - PROBLEM SELECTOR PLAN 3
likely due to ATN in the setting of sepsis, Cr 3/18 was 1.49  - Trend Cr and monitor UOP. Avoid NSAIDs, contrast, nephrotoxins. Renally dose meds.

## 2018-07-12 NOTE — ED PROVIDER NOTE - PROGRESS NOTE DETAILS
Blanco Guzman PGY2: left message w/ pcp service Blanco Guzman PGY2: d/w pcp service admits to hospitalist; paged hospitalist Blanco Guzman PGY2: paged hospitalist Blanco Guzman PGY2: d/w hospitalist agreed w/ plan and to admission; text paged MAR: Admit: [facility] [phone]  to Dr. Aly  sepsis, cellulitis / pna  callback: [phone]

## 2018-07-12 NOTE — H&P ADULT - NSHPREVIEWOFSYSTEMS_GEN_ALL_CORE
REVIEW OF SYSTEMS:    CONSTITUTIONAL: +Weakness, fevers, chills  EYES/ENT: +Stuffy nose. No visual changes. No throat pain.   NECK: No pain or stiffness  RESPIRATORY: +SOB. No cough, wheezing, or hemoptysis.  CARDIOVASCULAR: No chest pain or palpitations  GASTROINTESTINAL: No abdominal or epigastric pain. No nausea, vomiting, or hematemesis. No diarrhea or constipation. No melena or hematochezia.  GENITOURINARY: No dysuria, frequency, or hematuria  NEUROLOGICAL: No numbness/tingling  SKIN: No itching, burning, or rashes  All other review of systems is negative unless indicated above.

## 2018-07-12 NOTE — ED PROVIDER NOTE - MEDICAL DECISION MAKING DETAILS
78M w/ weakness; febrile/tachy here, septic, source uti vs cellulitis will check US, ua; labs, empiric ab, labs, to be admitted

## 2018-07-12 NOTE — H&P ADULT - PROBLEM SELECTOR PLAN 3
Nonpurulent cellulitis with no associated abscess.   - Given that pt's cellulitis noticeably improved with Vanc and Zosyn in the ED, will c/w Vanc and Zosyn for now. However, will dose Vanc by level for now given uncertainty of kidney function (VITOR vs. CKD), also renally dose Zosyn. Nonpurulent cellulitis with no associated abscess.   - Given that pt's cellulitis noticeably improved with Vanc and Zosyn in the ED, will c/w broad-spectrum antibiotics with Vanc and Zosyn, renally dosed.

## 2018-07-12 NOTE — PROGRESS NOTE ADULT - PROBLEM SELECTOR PLAN 1
-  Sepsis likely 2/2 multifocal PNA and cellulitis.  - Given that pt's wife was recently sick and hospitalized with PNA and given that pt's cellulitis noticeably improved following administration of Vanc and Zosyn, will c/w broad-spectrum antibiotics with Vanc and Zosyn, renally dosed.   - F/u blood cxs and urine cx  - Trend BP

## 2018-07-12 NOTE — PROGRESS NOTE ADULT - PROBLEM SELECTOR PLAN 4
Family endorsed worsening confusion  in the setting of underlying Alzheimer's disease  Mental status improving per family at bedside  c/w supportive care

## 2018-07-12 NOTE — PROGRESS NOTE ADULT - PROBLEM SELECTOR PROBLEM 3
Acute renal failure with other specified pathological kidney lesion superimposed on stage 3 chronic kidney disease

## 2018-07-13 ENCOUNTER — APPOINTMENT (OUTPATIENT)
Dept: HOME HEALTH SERVICES | Facility: HOME HEALTH | Age: 78
End: 2018-07-13

## 2018-07-13 LAB
ALBUMIN SERPL ELPH-MCNC: 2.9 G/DL — LOW (ref 3.3–5)
ALP SERPL-CCNC: 61 U/L — SIGNIFICANT CHANGE UP (ref 40–120)
ALT FLD-CCNC: 35 U/L — SIGNIFICANT CHANGE UP (ref 4–41)
AST SERPL-CCNC: 34 U/L — SIGNIFICANT CHANGE UP (ref 4–40)
BACTERIA UR CULT: SIGNIFICANT CHANGE UP
BILIRUB SERPL-MCNC: 0.9 MG/DL — SIGNIFICANT CHANGE UP (ref 0.2–1.2)
BUN SERPL-MCNC: 13 MG/DL — SIGNIFICANT CHANGE UP (ref 7–23)
CALCIUM SERPL-MCNC: 7.9 MG/DL — LOW (ref 8.4–10.5)
CHLORIDE SERPL-SCNC: 105 MMOL/L — SIGNIFICANT CHANGE UP (ref 98–107)
CO2 SERPL-SCNC: 24 MMOL/L — SIGNIFICANT CHANGE UP (ref 22–31)
CREAT SERPL-MCNC: 1.63 MG/DL — HIGH (ref 0.5–1.3)
GLUCOSE SERPL-MCNC: 117 MG/DL — HIGH (ref 70–99)
HCT VFR BLD CALC: 36.7 % — LOW (ref 39–50)
HGB BLD-MCNC: 12.7 G/DL — LOW (ref 13–17)
MAGNESIUM SERPL-MCNC: 2.1 MG/DL — SIGNIFICANT CHANGE UP (ref 1.6–2.6)
MCHC RBC-ENTMCNC: 32 PG — SIGNIFICANT CHANGE UP (ref 27–34)
MCHC RBC-ENTMCNC: 34.6 % — SIGNIFICANT CHANGE UP (ref 32–36)
MCV RBC AUTO: 92.4 FL — SIGNIFICANT CHANGE UP (ref 80–100)
NRBC # FLD: 0 — SIGNIFICANT CHANGE UP
PHOSPHATE SERPL-MCNC: 2.3 MG/DL — LOW (ref 2.5–4.5)
PLATELET # BLD AUTO: 146 K/UL — LOW (ref 150–400)
PMV BLD: 9.2 FL — SIGNIFICANT CHANGE UP (ref 7–13)
POTASSIUM SERPL-MCNC: 3.1 MMOL/L — LOW (ref 3.5–5.3)
POTASSIUM SERPL-SCNC: 3.1 MMOL/L — LOW (ref 3.5–5.3)
PROT SERPL-MCNC: 6.6 G/DL — SIGNIFICANT CHANGE UP (ref 6–8.3)
RBC # BLD: 3.97 M/UL — LOW (ref 4.2–5.8)
RBC # FLD: 12.7 % — SIGNIFICANT CHANGE UP (ref 10.3–14.5)
SODIUM SERPL-SCNC: 143 MMOL/L — SIGNIFICANT CHANGE UP (ref 135–145)
SPECIMEN SOURCE: SIGNIFICANT CHANGE UP
VANCOMYCIN TROUGH SERPL-MCNC: 3.6 UG/ML — LOW (ref 10–20)
WBC # BLD: 10.38 K/UL — SIGNIFICANT CHANGE UP (ref 3.8–10.5)
WBC # FLD AUTO: 10.38 K/UL — SIGNIFICANT CHANGE UP (ref 3.8–10.5)

## 2018-07-13 PROCEDURE — 99233 SBSQ HOSP IP/OBS HIGH 50: CPT

## 2018-07-13 RX ORDER — PIPERACILLIN AND TAZOBACTAM 4; .5 G/20ML; G/20ML
3.38 INJECTION, POWDER, LYOPHILIZED, FOR SOLUTION INTRAVENOUS EVERY 12 HOURS
Qty: 0 | Refills: 0 | Status: DISCONTINUED | OUTPATIENT
Start: 2018-07-13 | End: 2018-07-15

## 2018-07-13 RX ORDER — VANCOMYCIN HCL 1 G
1000 VIAL (EA) INTRAVENOUS ONCE
Qty: 0 | Refills: 0 | Status: COMPLETED | OUTPATIENT
Start: 2018-07-13 | End: 2018-07-13

## 2018-07-13 RX ADMIN — Medication 250 MILLIGRAM(S): at 11:47

## 2018-07-13 RX ADMIN — HEPARIN SODIUM 5000 UNIT(S): 5000 INJECTION INTRAVENOUS; SUBCUTANEOUS at 21:44

## 2018-07-13 RX ADMIN — PIPERACILLIN AND TAZOBACTAM 25 GRAM(S): 4; .5 INJECTION, POWDER, LYOPHILIZED, FOR SOLUTION INTRAVENOUS at 06:03

## 2018-07-13 RX ADMIN — Medication 1 TABLET(S): at 11:48

## 2018-07-13 RX ADMIN — HEPARIN SODIUM 5000 UNIT(S): 5000 INJECTION INTRAVENOUS; SUBCUTANEOUS at 06:03

## 2018-07-13 RX ADMIN — Medication 81 MILLIGRAM(S): at 11:48

## 2018-07-13 RX ADMIN — ATORVASTATIN CALCIUM 40 MILLIGRAM(S): 80 TABLET, FILM COATED ORAL at 21:44

## 2018-07-13 RX ADMIN — HEPARIN SODIUM 5000 UNIT(S): 5000 INJECTION INTRAVENOUS; SUBCUTANEOUS at 13:43

## 2018-07-13 RX ADMIN — PIPERACILLIN AND TAZOBACTAM 25 GRAM(S): 4; .5 INJECTION, POWDER, LYOPHILIZED, FOR SOLUTION INTRAVENOUS at 18:52

## 2018-07-13 NOTE — PROGRESS NOTE ADULT - PROBLEM SELECTOR PLAN 6
BPs in acceptable range.  - Will hold home BP meds in setting of sepsis and possibly VITOR.  Trend BP

## 2018-07-13 NOTE — PROGRESS NOTE ADULT - PROBLEM SELECTOR PLAN 3
likely due to ATN in the setting of sepsis, Cr 3/18 was 1.49  Cr improving with IVF  - Trend Cr and monitor UOP. Avoid NSAIDs, contrast, nephrotoxins. Renally dose meds.

## 2018-07-13 NOTE — PROGRESS NOTE ADULT - PROBLEM SELECTOR PLAN 1
-  Sepsis likely 2/2 multifocal PNA and cellulitis.  - Given that pt's wife was recently sick and hospitalized with PNA and given that pt's cellulitis noticeably improved following administration of Vanc and Zosyn, will c/w broad-spectrum antibiotics with Vanc ( by level) and Zosyn, renally dosed.   - will transition to levaquin after few days of IV Abx likely Sunday if he continues to improve  - F/u blood cxs and urine cx  - Trend BP

## 2018-07-13 NOTE — PROGRESS NOTE ADULT - PROBLEM SELECTOR PLAN 4
Family endorsed worsening confusion  in the setting of underlying Alzheimer's disease  Mental status improving now alert and oriented x 2 now  c/w supportive care

## 2018-07-14 LAB
ALBUMIN SERPL ELPH-MCNC: 3.1 G/DL — LOW (ref 3.3–5)
ALP SERPL-CCNC: 65 U/L — SIGNIFICANT CHANGE UP (ref 40–120)
ALT FLD-CCNC: 31 U/L — SIGNIFICANT CHANGE UP (ref 4–41)
AST SERPL-CCNC: 28 U/L — SIGNIFICANT CHANGE UP (ref 4–40)
BASOPHILS # BLD AUTO: 0.06 K/UL — SIGNIFICANT CHANGE UP (ref 0–0.2)
BASOPHILS NFR BLD AUTO: 0.7 % — SIGNIFICANT CHANGE UP (ref 0–2)
BILIRUB SERPL-MCNC: 0.8 MG/DL — SIGNIFICANT CHANGE UP (ref 0.2–1.2)
BUN SERPL-MCNC: 11 MG/DL — SIGNIFICANT CHANGE UP (ref 7–23)
CALCIUM SERPL-MCNC: 8.2 MG/DL — LOW (ref 8.4–10.5)
CHLORIDE SERPL-SCNC: 106 MMOL/L — SIGNIFICANT CHANGE UP (ref 98–107)
CO2 SERPL-SCNC: 21 MMOL/L — LOW (ref 22–31)
CREAT SERPL-MCNC: 1.49 MG/DL — HIGH (ref 0.5–1.3)
EOSINOPHIL # BLD AUTO: 0.21 K/UL — SIGNIFICANT CHANGE UP (ref 0–0.5)
EOSINOPHIL NFR BLD AUTO: 2.5 % — SIGNIFICANT CHANGE UP (ref 0–6)
GLUCOSE SERPL-MCNC: 97 MG/DL — SIGNIFICANT CHANGE UP (ref 70–99)
HCT VFR BLD CALC: 39.9 % — SIGNIFICANT CHANGE UP (ref 39–50)
HGB BLD-MCNC: 13.3 G/DL — SIGNIFICANT CHANGE UP (ref 13–17)
IMM GRANULOCYTES # BLD AUTO: 0.06 # — SIGNIFICANT CHANGE UP
IMM GRANULOCYTES NFR BLD AUTO: 0.7 % — SIGNIFICANT CHANGE UP (ref 0–1.5)
LYMPHOCYTES # BLD AUTO: 2.13 K/UL — SIGNIFICANT CHANGE UP (ref 1–3.3)
LYMPHOCYTES # BLD AUTO: 25 % — SIGNIFICANT CHANGE UP (ref 13–44)
MAGNESIUM SERPL-MCNC: 2.2 MG/DL — SIGNIFICANT CHANGE UP (ref 1.6–2.6)
MCHC RBC-ENTMCNC: 31.1 PG — SIGNIFICANT CHANGE UP (ref 27–34)
MCHC RBC-ENTMCNC: 33.3 % — SIGNIFICANT CHANGE UP (ref 32–36)
MCV RBC AUTO: 93.2 FL — SIGNIFICANT CHANGE UP (ref 80–100)
MONOCYTES # BLD AUTO: 0.98 K/UL — HIGH (ref 0–0.9)
MONOCYTES NFR BLD AUTO: 11.5 % — SIGNIFICANT CHANGE UP (ref 2–14)
NEUTROPHILS # BLD AUTO: 5.07 K/UL — SIGNIFICANT CHANGE UP (ref 1.8–7.4)
NEUTROPHILS NFR BLD AUTO: 59.6 % — SIGNIFICANT CHANGE UP (ref 43–77)
NRBC # FLD: 0 — SIGNIFICANT CHANGE UP
PHOSPHATE SERPL-MCNC: 2.7 MG/DL — SIGNIFICANT CHANGE UP (ref 2.5–4.5)
PLATELET # BLD AUTO: 187 K/UL — SIGNIFICANT CHANGE UP (ref 150–400)
PMV BLD: 9.8 FL — SIGNIFICANT CHANGE UP (ref 7–13)
POTASSIUM SERPL-MCNC: 3.3 MMOL/L — LOW (ref 3.5–5.3)
POTASSIUM SERPL-SCNC: 3.3 MMOL/L — LOW (ref 3.5–5.3)
PROT SERPL-MCNC: 7.1 G/DL — SIGNIFICANT CHANGE UP (ref 6–8.3)
RBC # BLD: 4.28 M/UL — SIGNIFICANT CHANGE UP (ref 4.2–5.8)
RBC # FLD: 12.7 % — SIGNIFICANT CHANGE UP (ref 10.3–14.5)
SODIUM SERPL-SCNC: 142 MMOL/L — SIGNIFICANT CHANGE UP (ref 135–145)
VANCOMYCIN FLD-MCNC: 6.9 UG/ML — SIGNIFICANT CHANGE UP
WBC # BLD: 8.51 K/UL — SIGNIFICANT CHANGE UP (ref 3.8–10.5)
WBC # FLD AUTO: 8.51 K/UL — SIGNIFICANT CHANGE UP (ref 3.8–10.5)

## 2018-07-14 PROCEDURE — 99232 SBSQ HOSP IP/OBS MODERATE 35: CPT

## 2018-07-14 RX ORDER — VANCOMYCIN HCL 1 G
1500 VIAL (EA) INTRAVENOUS ONCE
Qty: 0 | Refills: 0 | Status: DISCONTINUED | OUTPATIENT
Start: 2018-07-14 | End: 2018-07-14

## 2018-07-14 RX ORDER — VANCOMYCIN HCL 1 G
1250 VIAL (EA) INTRAVENOUS ONCE
Qty: 0 | Refills: 0 | Status: COMPLETED | OUTPATIENT
Start: 2018-07-14 | End: 2018-07-14

## 2018-07-14 RX ORDER — POTASSIUM CHLORIDE 20 MEQ
20 PACKET (EA) ORAL ONCE
Qty: 0 | Refills: 0 | Status: COMPLETED | OUTPATIENT
Start: 2018-07-14 | End: 2018-07-14

## 2018-07-14 RX ADMIN — HEPARIN SODIUM 5000 UNIT(S): 5000 INJECTION INTRAVENOUS; SUBCUTANEOUS at 15:19

## 2018-07-14 RX ADMIN — PIPERACILLIN AND TAZOBACTAM 25 GRAM(S): 4; .5 INJECTION, POWDER, LYOPHILIZED, FOR SOLUTION INTRAVENOUS at 05:51

## 2018-07-14 RX ADMIN — ATORVASTATIN CALCIUM 40 MILLIGRAM(S): 80 TABLET, FILM COATED ORAL at 22:20

## 2018-07-14 RX ADMIN — HEPARIN SODIUM 5000 UNIT(S): 5000 INJECTION INTRAVENOUS; SUBCUTANEOUS at 22:20

## 2018-07-14 RX ADMIN — PIPERACILLIN AND TAZOBACTAM 25 GRAM(S): 4; .5 INJECTION, POWDER, LYOPHILIZED, FOR SOLUTION INTRAVENOUS at 18:32

## 2018-07-14 RX ADMIN — HEPARIN SODIUM 5000 UNIT(S): 5000 INJECTION INTRAVENOUS; SUBCUTANEOUS at 05:51

## 2018-07-14 RX ADMIN — Medication 81 MILLIGRAM(S): at 15:19

## 2018-07-14 RX ADMIN — Medication 250 MILLIGRAM(S): at 15:19

## 2018-07-14 RX ADMIN — Medication 20 MILLIEQUIVALENT(S): at 10:53

## 2018-07-14 NOTE — PROGRESS NOTE ADULT - PROBLEM SELECTOR PLAN 3
likely due to ATN in the setting of sepsis, Cr today was 1.49  Cr improving with IVF  - Trend Cr and monitor UOP. Avoid NSAIDs, contrast, nephrotoxins. Renally dose meds.

## 2018-07-15 ENCOUNTER — TRANSCRIPTION ENCOUNTER (OUTPATIENT)
Age: 78
End: 2018-07-15

## 2018-07-15 VITALS
OXYGEN SATURATION: 97 % | RESPIRATION RATE: 18 BRPM | HEART RATE: 54 BPM | SYSTOLIC BLOOD PRESSURE: 131 MMHG | TEMPERATURE: 98 F | DIASTOLIC BLOOD PRESSURE: 74 MMHG

## 2018-07-15 LAB
ALBUMIN SERPL ELPH-MCNC: 3.1 G/DL — LOW (ref 3.3–5)
ALP SERPL-CCNC: 60 U/L — SIGNIFICANT CHANGE UP (ref 40–120)
ALT FLD-CCNC: 27 U/L — SIGNIFICANT CHANGE UP (ref 4–41)
AST SERPL-CCNC: 28 U/L — SIGNIFICANT CHANGE UP (ref 4–40)
BASOPHILS # BLD AUTO: 0.06 K/UL — SIGNIFICANT CHANGE UP (ref 0–0.2)
BASOPHILS NFR BLD AUTO: 0.7 % — SIGNIFICANT CHANGE UP (ref 0–2)
BILIRUB SERPL-MCNC: 0.6 MG/DL — SIGNIFICANT CHANGE UP (ref 0.2–1.2)
BUN SERPL-MCNC: 9 MG/DL — SIGNIFICANT CHANGE UP (ref 7–23)
CALCIUM SERPL-MCNC: 8.3 MG/DL — LOW (ref 8.4–10.5)
CHLORIDE SERPL-SCNC: 106 MMOL/L — SIGNIFICANT CHANGE UP (ref 98–107)
CO2 SERPL-SCNC: 22 MMOL/L — SIGNIFICANT CHANGE UP (ref 22–31)
CREAT SERPL-MCNC: 1.42 MG/DL — HIGH (ref 0.5–1.3)
EOSINOPHIL # BLD AUTO: 0.31 K/UL — SIGNIFICANT CHANGE UP (ref 0–0.5)
EOSINOPHIL NFR BLD AUTO: 3.7 % — SIGNIFICANT CHANGE UP (ref 0–6)
GLUCOSE SERPL-MCNC: 90 MG/DL — SIGNIFICANT CHANGE UP (ref 70–99)
HCT VFR BLD CALC: 37.4 % — LOW (ref 39–50)
HGB BLD-MCNC: 12.7 G/DL — LOW (ref 13–17)
IMM GRANULOCYTES # BLD AUTO: 0.09 # — SIGNIFICANT CHANGE UP
IMM GRANULOCYTES NFR BLD AUTO: 1.1 % — SIGNIFICANT CHANGE UP (ref 0–1.5)
LYMPHOCYTES # BLD AUTO: 2.37 K/UL — SIGNIFICANT CHANGE UP (ref 1–3.3)
LYMPHOCYTES # BLD AUTO: 28.3 % — SIGNIFICANT CHANGE UP (ref 13–44)
MAGNESIUM SERPL-MCNC: 2.2 MG/DL — SIGNIFICANT CHANGE UP (ref 1.6–2.6)
MCHC RBC-ENTMCNC: 31.3 PG — SIGNIFICANT CHANGE UP (ref 27–34)
MCHC RBC-ENTMCNC: 34 % — SIGNIFICANT CHANGE UP (ref 32–36)
MCV RBC AUTO: 92.1 FL — SIGNIFICANT CHANGE UP (ref 80–100)
MONOCYTES # BLD AUTO: 1 K/UL — HIGH (ref 0–0.9)
MONOCYTES NFR BLD AUTO: 11.9 % — SIGNIFICANT CHANGE UP (ref 2–14)
NEUTROPHILS # BLD AUTO: 4.55 K/UL — SIGNIFICANT CHANGE UP (ref 1.8–7.4)
NEUTROPHILS NFR BLD AUTO: 54.3 % — SIGNIFICANT CHANGE UP (ref 43–77)
NRBC # FLD: 0 — SIGNIFICANT CHANGE UP
PHOSPHATE SERPL-MCNC: 2.6 MG/DL — SIGNIFICANT CHANGE UP (ref 2.5–4.5)
PLATELET # BLD AUTO: 186 K/UL — SIGNIFICANT CHANGE UP (ref 150–400)
PMV BLD: 9.9 FL — SIGNIFICANT CHANGE UP (ref 7–13)
POTASSIUM SERPL-MCNC: 3.5 MMOL/L — SIGNIFICANT CHANGE UP (ref 3.5–5.3)
POTASSIUM SERPL-SCNC: 3.5 MMOL/L — SIGNIFICANT CHANGE UP (ref 3.5–5.3)
PROT SERPL-MCNC: 6.8 G/DL — SIGNIFICANT CHANGE UP (ref 6–8.3)
RBC # BLD: 4.06 M/UL — LOW (ref 4.2–5.8)
RBC # FLD: 12.8 % — SIGNIFICANT CHANGE UP (ref 10.3–14.5)
SODIUM SERPL-SCNC: 140 MMOL/L — SIGNIFICANT CHANGE UP (ref 135–145)
VANCOMYCIN FLD-MCNC: 10.6 UG/ML — SIGNIFICANT CHANGE UP
WBC # BLD: 8.38 K/UL — SIGNIFICANT CHANGE UP (ref 3.8–10.5)
WBC # FLD AUTO: 8.38 K/UL — SIGNIFICANT CHANGE UP (ref 3.8–10.5)

## 2018-07-15 PROCEDURE — 99239 HOSP IP/OBS DSCHRG MGMT >30: CPT

## 2018-07-15 RX ORDER — CIPROFLOXACIN LACTATE 400MG/40ML
1 VIAL (ML) INTRAVENOUS
Qty: 6 | Refills: 0
Start: 2018-07-15 | End: 2018-07-20

## 2018-07-15 RX ORDER — FUROSEMIDE 40 MG
1 TABLET ORAL
Qty: 0 | Refills: 0 | COMMUNITY

## 2018-07-15 RX ORDER — SODIUM CHLORIDE 0.65 %
1 AEROSOL, SPRAY (ML) NASAL
Qty: 1 | Refills: 0
Start: 2018-07-15 | End: 2018-07-28

## 2018-07-15 RX ORDER — LISINOPRIL 2.5 MG/1
1 TABLET ORAL
Qty: 0 | Refills: 0 | COMMUNITY

## 2018-07-15 RX ORDER — VANCOMYCIN HCL 1 G
1250 VIAL (EA) INTRAVENOUS ONCE
Qty: 0 | Refills: 0 | Status: COMPLETED | OUTPATIENT
Start: 2018-07-15 | End: 2018-07-15

## 2018-07-15 RX ADMIN — HEPARIN SODIUM 5000 UNIT(S): 5000 INJECTION INTRAVENOUS; SUBCUTANEOUS at 06:49

## 2018-07-15 RX ADMIN — HEPARIN SODIUM 5000 UNIT(S): 5000 INJECTION INTRAVENOUS; SUBCUTANEOUS at 14:25

## 2018-07-15 RX ADMIN — Medication 81 MILLIGRAM(S): at 14:25

## 2018-07-15 RX ADMIN — PIPERACILLIN AND TAZOBACTAM 25 GRAM(S): 4; .5 INJECTION, POWDER, LYOPHILIZED, FOR SOLUTION INTRAVENOUS at 06:49

## 2018-07-15 RX ADMIN — Medication 250 MILLIGRAM(S): at 14:25

## 2018-07-15 NOTE — PROGRESS NOTE ADULT - NSHPATTENDINGPLANDISCUSS_GEN_ALL_CORE
Pt and granddaughter at bedside
Pt and family at bedside
Pt and granddaughter at bedside
Pt and granddaughter at bedside

## 2018-07-15 NOTE — DISCHARGE NOTE ADULT - HOSPITAL COURSE
Sepsis  -Pt met SIRS criteria with T 101.2F oral, tachycardia to 105, and WBC 14.37 with 7% bands. CXR with bilateral lower lobe perihilar opacities, possibly multifocal PNA  - likely 2/2 multifocal PNA and cellulitis.  - Given that pt's wife was recently sick and hospitalized with PNA and given that pt's cellulitis noticeably improved following administration of Vanc and Zosyn, will c/w broad-spectrum antibiotics with Vanc and Zosyn, renally dosed.   - blood cxs- no growth at 72 hours  - ucx- no growth at 48 hours   - S/p 1L bolus of NS in the ED  - gentle IVF with NS at 50 cc/hr x 10 hrs    PNA  -CXR with bilateral lower lobe perihilar opacities, possibly multifocal PNA.  - Given that pt's wife was recently sick and hospitalized with PNA, will will c/w broad-spectrum antibiotics with Vanc and Zosyn, renally dosed. CXR with bilateral lower lobe perihilar opacities, possibly multifocal PNA.     Cellulitis  - Nonpurulent cellulitis with no associated abscess.   - Given that pt's cellulitis noticeably improved with Vanc and Zosyn in the ED, will c/w broad-spectrum antibiotics with Vanc and Zosyn, renally dosed. Nonpurulent cellulitis with no associated abscess.   -US RLE- neg for DVT    Liver function test abnormality  -AST and ALT mildly elevated to ~2x ULN and t bili elevated to 1.6. Alk phos, however, wnl. Unclear etiology. No complaints of abdominal pain and abdominal exam benign.   - Trend LFTs for now. Obtain direct bili vs. indirect bili levels.     CAD  - verified that pt takes ASA 81mg daily ([facility])  - C/w atorvastatin 40 mg qhs.     HTN  - BPs in acceptable range.  - hold home BP meds (lisinopril 40 mg daily and lasix 20 mg daily) in setting of sepsis and possibly VITOR  -Will resume home BP meds as tolerated.    Hyperlipidemia  - C/w atorvastatin 40 mg qhs.     CKD  -Cr elevated to 2.21. Unclear if purely CKD or VITOR on CKD. No recent Cr values.  - s/p 1L bolus of NS   - gentle IVF with NS at 50 cc/hr x 10 hrs  - Trend Cr and monitor UOP. Avoid NSAIDs, contrast, nephrotoxins. Renally dose meds.     Need for prophylactic measure  -DVT ppx: HSQ

## 2018-07-15 NOTE — PROGRESS NOTE ADULT - SUBJECTIVE AND OBJECTIVE BOX
Chief Complaint: Patient is a 78y old  Male who presents with a chief complaint of PNA and cellulitis (12 Jul 2018 05:19)      INTERVAL HPI/OVERNIGHT EVENTS: No complaints. Feels better        MEDICATIONS  (STANDING):  aspirin enteric coated 81 milliGRAM(s) Oral daily  atorvastatin 40 milliGRAM(s) Oral at bedtime  heparin  Injectable 5000 Unit(s) SubCutaneous every 8 hours  piperacillin/tazobactam IVPB. 3.375 Gram(s) IV Intermittent every 12 hours  sodium chloride 0.9%. 1000 milliLiter(s) (50 mL/Hr) IV Continuous <Continuous>    MEDICATIONS  (PRN):  acetaminophen   Tablet. 650 milliGRAM(s) Oral every 6 hours PRN mild, moderate, severe pain  sodium chloride 0.65% Nasal 1 Spray(s) Both Nostrils every 4 hours PRN Nasal Congestion      Vital Signs Last 24 Hrs  T(C): 37 (14 Jul 2018 18:26), Max: 37.2 (14 Jul 2018 05:50)  T(F): 98.6 (14 Jul 2018 18:26), Max: 98.9 (14 Jul 2018 05:50)  HR: 53 (14 Jul 2018 18:26) (53 - 63)  BP: 156/79 (14 Jul 2018 18:26) (140/76 - 156/79)  BP(mean): --  RR: 16 (14 Jul 2018 18:26) (16 - 18)  SpO2: 99% (14 Jul 2018 18:26) (97% - 100%)      I&O's Detail    13 Jul 2018 07:01  -  14 Jul 2018 07:00  --------------------------------------------------------  IN:    IV PiggyBack: 100 mL  Total IN: 100 mL    OUT:    Voided: 200 mL  Total OUT: 200 mL    Total NET: -100 mL      14 Jul 2018 07:01  -  14 Jul 2018 19:13  --------------------------------------------------------  IN:    Oral Fluid: 600 mL    sodium chloride 0.9%.: 250 mL  Total IN: 850 mL    OUT:  Total OUT: 0 mL    Total NET: 850 mL        CAPILLARY BLOOD GLUCOSE          PHYSICAL EXAM:    GENERAL: NAD  Pulm: CTA b/l  CV: S1&S2+, rrr  ABDOMEN: bs+, soft, nt, nd,   EXTREMITIES:  2+ peripheral pulses, no appreciable edema in b/l LE  Neuro: Awake, alert      LABS:                        13.3   8.51  )-----------( 187      ( 14 Jul 2018 06:00 )             39.9     07-14    142  |  106  |  11  ----------------------------<  97  3.3<L>   |  21<L>  |  1.49<H>    Ca    8.2<L>      14 Jul 2018 06:45  Phos  2.7     07-14  Mg     2.2     07-14    TPro  7.1  /  Alb  3.1<L>  /  TBili  0.8  /  DBili  x   /  AST  28  /  ALT  31  /  AlkPhos  65  07-14    LIVER FUNCTIONS - ( 14 Jul 2018 06:45 )  Alb: 3.1 g/dL / Pro: 7.1 g/dL / ALK PHOS: 65 u/L / ALT: 31 u/L / AST: 28 u/L / GGT: x     / T. Bili 0.8 mg/dL / D. Bili x                     Microbiology:  Culture - Urine (07.12.18 @ 00:27)    Culture - Urine:   NO GROWTH AT 24 HOURS    Specimen Source: URINE MIDSTREAM        RADIOLOGY & ADDITIONAL TESTS:< from: Xray Chest 2 Views PA/Lat (07.12.18 @ 01:00) >    IMPRESSION:    No localized pulmonary disease.    < end of copied text >      Imaging Personally Reviewed:     Consultant(s) Notes Reviewed:      Care Discussed with Consultants/Other Providers
Patient is a 78y old  Male who presents with a chief complaint of PNA and cellulitis (2018 05:19)      SUBJECTIVE / OVERNIGHT EVENTS: Pt endorses improved leg pain. No fever or chills    MEDICATIONS  (STANDING):  aspirin enteric coated 81 milliGRAM(s) Oral daily  atorvastatin 40 milliGRAM(s) Oral at bedtime  heparin  Injectable 5000 Unit(s) SubCutaneous every 8 hours  piperacillin/tazobactam IVPB. 3.375 Gram(s) IV Intermittent every 12 hours  sodium chloride 0.9%. 1000 milliLiter(s) (50 mL/Hr) IV Continuous <Continuous>    MEDICATIONS  (PRN):  acetaminophen   Tablet. 650 milliGRAM(s) Oral every 6 hours PRN mild, moderate, severe pain  sodium chloride 0.65% Nasal 1 Spray(s) Both Nostrils every 4 hours PRN Nasal Congestion      T(C): 36.4 (18 @ 09:41), Max: 37.3 (18 @ 22:45)  HR: 74 (18 @ 09:41) (64 - 74)  BP: 121/77 (18 @ 09:41) (120/61 - 153/80)  RR: 18 (18 @ 09:41) (18 - 20)  SpO2: 98% (18 @ 09:41) (97% - 99%)  CAPILLARY BLOOD GLUCOSE        I&O's Summary    2018 07:01  -  2018 07:00  --------------------------------------------------------  IN: 650 mL / OUT: 575 mL / NET: 75 mL        PHYSICAL EXAM:  GENERAL: NAD,   HEAD:  Normocephalic  EYES: EOMI,  conjunctiva and sclera clear  NECK: Supple,   CHEST/LUNG: Clear to auscultation bilaterally; No wheeze  HEART:  s1 s2 + Regular rate and rhythm;   ABDOMEN: Soft, Nontender, Nondistended; Bowel sounds present  EXTREMITIES:   No clubbing, cyanosis. Improved RLE erythema  NEURO: AAOx2 ( self and place)      LABS:                        12.7   10.38 )-----------( 146      ( 2018 06:10 )             36.7     07-13    143  |  105  |  13  ----------------------------<  117<H>  3.1<L>   |  24  |  1.63<H>    Ca    7.9<L>      2018 06:10  Phos  2.3     -  Mg     2.1         TPro  6.6  /  Alb  2.9<L>  /  TBili  0.9  /  DBili  x   /  AST  34  /  ALT  35  /  AlkPhos  61  07-          Urinalysis Basic - ( 2018 21:12 )    Color: YELLOW / Appearance: CLEAR / S.011 / pH: 7.5  Gluc: NEGATIVE / Ketone: NEGATIVE  / Bili: NEGATIVE / Urobili: NORMAL mg/dL   Blood: TRACE / Protein: 100 mg/dL / Nitrite: NEGATIVE   Leuk Esterase: NEGATIVE / RBC: 5-10 / WBC 0-2   Sq Epi: x / Non Sq Epi: x / Bacteria: x
Patient is a 78y old  Male who presents with a chief complaint of PNA and cellulitis (2018 05:19)      SUBJECTIVE / OVERNIGHT EVENTS: Pt states he feels better. He still has mild LE pain    MEDICATIONS  (STANDING):  aspirin  chewable 81 milliGRAM(s) Oral daily  atorvastatin 40 milliGRAM(s) Oral at bedtime  heparin  Injectable 5000 Unit(s) SubCutaneous every 8 hours  piperacillin/tazobactam IVPB. 3.375 Gram(s) IV Intermittent every 12 hours  potassium acid phosphate/sodium acid phosphate tablet (K-PHOS No. 2) 1 Tablet(s) Oral four times a day with meals  sodium chloride 0.9%. 1000 milliLiter(s) (50 mL/Hr) IV Continuous <Continuous>    MEDICATIONS  (PRN):      T(C): 37.4 (18 @ 11:35), Max: 38.4 (18 @ 19:25)  HR: 59 (18 @ 11:35) (56 - 105)  BP: 130/73 (18 @ 11:35) (113/76 - 150/87)  RR: 18 (18 @ 11:35) (17 - 21)  SpO2: 97% (18 @ 11:35) (95% - 98%)  CAPILLARY BLOOD GLUCOSE        I&O's Summary    2018 07:01  -  2018 12:51  --------------------------------------------------------  IN: 0 mL / OUT: 450 mL / NET: -450 mL        PHYSICAL EXAM:  GENERAL: NAD,   HEAD:  Normocephalic  EYES: EOMI,  conjunctiva and sclera clear  NECK: Supple,   CHEST/LUNG: diffuse rhonchi  HEART:  s1 s2 + Regular rate and rhythm;   ABDOMEN: Soft, Nontender, Nondistended; Bowel sounds present  EXTREMITIES:   No clubbing, cyanosis  NEURO: AAOx1 ( self)  SKIN: RLE erythema, warm to touch    LABS:                        14.7   13.78 )-----------( 180      ( 2018 07:30 )             42.7     07-12    139  |  100  |  16  ----------------------------<  116<H>  3.2<L>   |  27  |  1.99<H>    Ca    8.4      2018 07:30  Phos  2.3     07-  Mg     2.0         TPro  7.9  /  Alb  3.7  /  TBili  1.3<H>  /  DBili  0.5<H>  /  AST  68<H>  /  ALT  65<H>  /  AlkPhos  80  07-12          Urinalysis Basic - ( 2018 21:12 )    Color: YELLOW / Appearance: CLEAR / S.011 / pH: 7.5  Gluc: NEGATIVE / Ketone: NEGATIVE  / Bili: NEGATIVE / Urobili: NORMAL mg/dL   Blood: TRACE / Protein: 100 mg/dL / Nitrite: NEGATIVE   Leuk Esterase: NEGATIVE / RBC: 5-10 / WBC 0-2   Sq Epi: x / Non Sq Epi: x / Bacteria: x
Chief Complaint: Patient is a 78y old  Male who presents with a chief complaint of PNA and cellulitis (15 Jul 2018 12:12)      INTERVAL HPI/OVERNIGHT EVENTS: Patient has no complaints. Feels fine.         MEDICATIONS  (STANDING):  aspirin enteric coated 81 milliGRAM(s) Oral daily  atorvastatin 40 milliGRAM(s) Oral at bedtime  heparin  Injectable 5000 Unit(s) SubCutaneous every 8 hours  piperacillin/tazobactam IVPB. 3.375 Gram(s) IV Intermittent every 12 hours  sodium chloride 0.9%. 1000 milliLiter(s) (50 mL/Hr) IV Continuous <Continuous>    MEDICATIONS  (PRN):  acetaminophen   Tablet. 650 milliGRAM(s) Oral every 6 hours PRN mild, moderate, severe pain  sodium chloride 0.65% Nasal 1 Spray(s) Both Nostrils every 4 hours PRN Nasal Congestion      Vital Signs Last 24 Hrs  T(C): 36.6 (15 Jul 2018 14:00), Max: 37 (14 Jul 2018 18:26)  T(F): 97.8 (15 Jul 2018 14:00), Max: 98.6 (14 Jul 2018 18:26)  HR: 54 (15 Jul 2018 14:00) (50 - 60)  BP: 131/74 (15 Jul 2018 14:00) (131/74 - 158/80)  BP(mean): --  RR: 18 (15 Jul 2018 14:00) (16 - 18)  SpO2: 97% (15 Jul 2018 14:00) (97% - 99%)      I&O's Detail    14 Jul 2018 07:01  -  15 Jul 2018 07:00  --------------------------------------------------------  IN:    IV PiggyBack: 350 mL    Oral Fluid: 840 mL    sodium chloride 0.9%.: 250 mL  Total IN: 1440 mL    OUT:    Voided: 700 mL  Total OUT: 700 mL    Total NET: 740 mL      15 Jul 2018 07:01  -  15 Jul 2018 15:40  --------------------------------------------------------  IN:    IV PiggyBack: 350 mL    Oral Fluid: 600 mL  Total IN: 950 mL    OUT:    Voided: 300 mL  Total OUT: 300 mL    Total NET: 650 mL        CAPILLARY BLOOD GLUCOSE          PHYSICAL EXAM:    GENERAL: NAD  Pulm: CTA b/l  CV: S1&S2+, rrr  ABDOMEN: bs+, soft, nt, nd,   EXTREMITIES:  2+ peripheral pulses, no appreciable edema in b/l LE  Neuro: Awake, alert      LABS:                        12.7   8.38  )-----------( 186      ( 15 Jul 2018 06:00 )             37.4     07-15    140  |  106  |  9   ----------------------------<  90  3.5   |  22  |  1.42<H>    Ca    8.3<L>      15 Jul 2018 06:00  Phos  2.6     07-15  Mg     2.2     07-15    TPro  6.8  /  Alb  3.1<L>  /  TBili  0.6  /  DBili  x   /  AST  28  /  ALT  27  /  AlkPhos  60  07-15    LIVER FUNCTIONS - ( 15 Jul 2018 06:00 )  Alb: 3.1 g/dL / Pro: 6.8 g/dL / ALK PHOS: 60 u/L / ALT: 27 u/L / AST: 28 u/L / GGT: x     / T. Bili 0.6 mg/dL / D. Bili x                     Microbiology:  Culture - Urine (07.12.18 @ 00:27)    Culture - Urine:   NO GROWTH AT 24 HOURS    Specimen Source: URINE MIDSTREAM    Culture - Blood (07.11.18 @ 22:00)    Culture - Blood:   NO ORGANISMS ISOLATED  NO ORGANISMS ISOLATED AT 72 HRS.    Specimen Source: BLOOD VENOUS        RADIOLOGY & ADDITIONAL TESTS:  < from: Xray Chest 2 Views PA/Lat (07.12.18 @ 01:00) >    IMPRESSION:    No localized pulmonary disease.    < end of copied text >      Imaging Personally Reviewed:     Consultant(s) Notes Reviewed:      Care Discussed with Consultants/Other Providers

## 2018-07-15 NOTE — PROGRESS NOTE ADULT - PROBLEM SELECTOR PLAN 3
likely due to ATN in the setting of sepsis, Cr today was 1.42  Cr improving with IVF  - Trend Cr and monitor UOP. Avoid NSAIDs, contrast, nephrotoxins. Renally dose meds.  will hold lasix and lisinopril, patient will see PCP within 1 week to restart

## 2018-07-15 NOTE — DISCHARGE NOTE ADULT - CONDITIONS AT DISCHARGE
Patient with history of dementia is alert and oriented x3. Infrequent periods of confusion, patient easily reoriented. Vital signs stable, free from respiratory distress. No complaints of pain. Family at bed side. Tolerating IV abx. RLE erythema and swelling decreased. OOB with assist. Patient discharged to home. Discharge instructions given and understood. IV removed. Patient left via wheelchair.

## 2018-07-15 NOTE — DISCHARGE NOTE ADULT - PATIENT PORTAL LINK FT
You can access the [facility] Portal, offered by [facility], by registering with the following website: [email or web address]

## 2018-07-15 NOTE — DISCHARGE NOTE ADULT - PLAN OF CARE
Kidney function tests returning to baseline. Elevated levels likely from acute infection. Follow up with PMD in 1 week for repeat BMP to evaluate levels resolved. Also likely from acute infection. Follow up with PMD in 1 week for repeat hepatic panel to check levels. in the setting of Alzheimers disease. Mental status improving. Follow up with PMD resolution Likely multifactorial relating to pneumonia and lower extremity cellulitis. Complete antibiotic course as prescribed. Follow up with PMD in 1 week for re-eval and further management. Return to ED for worsening signs of infection such as sudden high fevers, shortness of breath, chest pain or altered mental status. continue norvasc, hold off on taking lisinopril and lasix until follow up with PMD. PMD to decide whether or not to restart lisinopril and lasix

## 2018-07-15 NOTE — PROGRESS NOTE ADULT - ASSESSMENT
77 yo man with h/o CAD s/p stents (last one in 2017), HTN, HLD, CKD (unclear Cr baseline), and Alzheimer's dementia (independent with most ADLs but usually not oriented to place) presents with shaking/chills and weakness that started Tuesday night and 1 week of R leg swelling with redness and warmth, admitted with sepsis likely 2/2 multifocal PNA and cellulitis.
77 yo man with h/o CAD s/p stents (last one in 2017), HTN, HLD, CKD (unclear Cr baseline), and Alzheimer's dementia (independent with most ADLs but usually not oriented to place) presents with shaking/chills and weakness that started Tuesday night and 1 week of R leg swelling with redness and warmth, admitted with sepsis likely 2/2 multifocal PNA and cellulitis.
79 yo man with h/o CAD s/p stents (last one in 2017), HTN, HLD, CKD (unclear Cr baseline), and Alzheimer's dementia (independent with most ADLs but usually not oriented to place) presents with shaking/chills and weakness that started Tuesday night and 1 week of R leg swelling with redness and warmth, admitted with sepsis likely 2/2 multifocal PNA and cellulitis.
79 yo man with h/o CAD s/p stents (last one in 2017), HTN, HLD, CKD (unclear Cr baseline), and Alzheimer's dementia (independent with most ADLs but usually not oriented to place) presents with shaking/chills and weakness that started Tuesday night and 1 week of R leg swelling with redness and warmth, admitted with sepsis likely 2/2 multifocal PNA and cellulitis.

## 2018-07-15 NOTE — PROGRESS NOTE ADULT - ATTENDING COMMENTS
D/c today  Will d/c on levaquin X 6 days  C/w asa and lipitor  Will hold lasix and lisinopril, patient will see PCP and restart in 1 week  D/c planning 40 minutes
Possible d/c on Sunday or Monday pending clinical improvement  Will likely swtich to PO abx Sunday

## 2018-07-15 NOTE — PROGRESS NOTE ADULT - PROBLEM SELECTOR PLAN 1
-  Sepsis likely 2/2 multifocal PNA and cellulitis.  - Given that pt's wife was recently sick and hospitalized with PNA and given that pt's cellulitis noticeably improved following administration of Vanc and Zosyn, will c/w broad-spectrum antibiotics with Vanc ( by level) and Zosyn, renally dosed.   - will transition to levaquin X 6 days starting tomorrow, getting last dose of vanc/zosyn today  - F/u blood cxs and urine cx  - Trend BP

## 2018-07-15 NOTE — DISCHARGE NOTE ADULT - SECONDARY DIAGNOSIS.
Liver function test abnormality Metabolic encephalopathy Acute renal failure with other specified pathological kidney lesion superimposed on stage 3 chronic kidney disease HTN (Hypertension)

## 2018-07-15 NOTE — DISCHARGE NOTE ADULT - MEDICATION SUMMARY - MEDICATIONS TO TAKE
I will START or STAY ON the medications listed below when I get home from the hospital:    aspirin 81 mg oral tablet  -- 1 tab(s) by mouth once a day  -- Indication: For CAD (coronary artery disease)    atorvastatin 40 mg oral tablet  -- 1 tab(s) by mouth once a day  -- Indication: For Hyperlipidemia    Norvasc 10 mg oral tablet  -- 1 tab(s) by mouth once a day  -- Indication: For HTN (Hypertension)    Exelon 4.6 mg/24 hr transdermal film, extended release  -- 1 patch by transdermal patch once a day  -- Indication: For dementia    sodium chloride 0.65% nasal spray  -- 1 spray(s) in each affected nostril every 4 hours   -- Indication: For Nasal congestion I will START or STAY ON the medications listed below when I get home from the hospital:    aspirin 81 mg oral tablet  -- 1 tab(s) by mouth once a day  -- Indication: For CAD (coronary artery disease)    atorvastatin 40 mg oral tablet  -- 1 tab(s) by mouth once a day  -- Indication: For Hyperlipidemia    Norvasc 10 mg oral tablet  -- 1 tab(s) by mouth once a day  -- Indication: For HTN (Hypertension)    Exelon 4.6 mg/24 hr transdermal film, extended release  -- 1 patch by transdermal patch once a day  -- Indication: For dementia    sodium chloride 0.65% nasal spray  -- 1 spray(s) in each affected nostril every 4 hours   -- Indication: For Nasal congestion    Levaquin 500 mg oral tablet  -- 1 tab(s) by mouth once a day   -- Avoid prolonged or excessive exposure to direct and/or artificial sunlight while taking this medication.  Do not take dairy products, antacids, or iron preparations within one hour of this medication.  Finish all this medication unless otherwise directed by prescriber.  May cause drowsiness or dizziness.  Medication should be taken with plenty of water.    -- Indication: For PNA (pneumonia)

## 2018-07-15 NOTE — DISCHARGE NOTE ADULT - CARE PLAN
Principal Discharge DX:	Sepsis  Goal:	resolution  Assessment and plan of treatment:	Likely multifactorial relating to pneumonia and lower extremity cellulitis. Complete antibiotic course as prescribed. Follow up with PMD in 1 week for re-eval and further management. Return to ED for worsening signs of infection such as sudden high fevers, shortness of breath, chest pain or altered mental status.  Secondary Diagnosis:	Acute renal failure with other specified pathological kidney lesion superimposed on stage 3 chronic kidney disease  Assessment and plan of treatment:	Kidney function tests returning to baseline. Elevated levels likely from acute infection. Follow up with PMD in 1 week for repeat BMP to evaluate levels  Secondary Diagnosis:	Liver function test abnormality  Assessment and plan of treatment:	resolved. Also likely from acute infection. Follow up with PMD in 1 week for repeat hepatic panel to check levels.  Secondary Diagnosis:	Metabolic encephalopathy  Assessment and plan of treatment:	in the setting of Alzheimers disease. Mental status improving. Follow up with PMD Principal Discharge DX:	Sepsis  Goal:	resolution  Assessment and plan of treatment:	Likely multifactorial relating to pneumonia and lower extremity cellulitis. Complete antibiotic course as prescribed. Follow up with PMD in 1 week for re-eval and further management. Return to ED for worsening signs of infection such as sudden high fevers, shortness of breath, chest pain or altered mental status.  Secondary Diagnosis:	Acute renal failure with other specified pathological kidney lesion superimposed on stage 3 chronic kidney disease  Assessment and plan of treatment:	Kidney function tests returning to baseline. Elevated levels likely from acute infection. Follow up with PMD in 1 week for repeat BMP to evaluate levels  Secondary Diagnosis:	Liver function test abnormality  Assessment and plan of treatment:	resolved. Also likely from acute infection. Follow up with PMD in 1 week for repeat hepatic panel to check levels.  Secondary Diagnosis:	Metabolic encephalopathy  Assessment and plan of treatment:	in the setting of Alzheimers disease. Mental status improving. Follow up with PMD  Secondary Diagnosis:	HTN (Hypertension)  Assessment and plan of treatment:	continue norvasc, hold off on taking lisinopril and lasix until follow up with PMD. PMD to decide whether or not to restart lisinopril and lasix

## 2018-07-15 NOTE — PROGRESS NOTE ADULT - PROBLEM SELECTOR PLAN 6
BPs in acceptable range.  - Will hold home BP meds in setting of sepsis and possibly VITOR.  Trend BP  Restart BP meds in 1 week with PCP

## 2018-07-15 NOTE — DISCHARGE NOTE ADULT - MEDICATION SUMMARY - MEDICATIONS TO STOP TAKING
I will STOP taking the medications listed below when I get home from the hospital:    lisinopril 40 mg oral tablet  -- 1 tab(s) by mouth once a day    Lasix 20 mg oral tablet  -- 1 tab(s) by mouth once a day

## 2018-07-15 NOTE — DISCHARGE NOTE ADULT - CARE PROVIDER_API CALL
Stacey Corona), Family Medicine  [address] [place] [address]  Phone: [phone]  Fax: [phone]

## 2018-07-16 LAB
BACTERIA BLD CULT: SIGNIFICANT CHANGE UP
BACTERIA BLD CULT: SIGNIFICANT CHANGE UP

## 2018-07-17 ENCOUNTER — APPOINTMENT (OUTPATIENT)
Dept: HOME HEALTH SERVICES | Facility: HOME HEALTH | Age: 78
End: 2018-07-17
Payer: MEDICARE

## 2018-07-17 VITALS — WEIGHT: 146 LBS | HEIGHT: 64 IN | BODY MASS INDEX: 24.92 KG/M2

## 2018-07-17 VITALS
RESPIRATION RATE: 17 BRPM | SYSTOLIC BLOOD PRESSURE: 140 MMHG | DIASTOLIC BLOOD PRESSURE: 60 MMHG | HEART RATE: 59 BPM | OXYGEN SATURATION: 97 %

## 2018-07-17 PROCEDURE — 99496 TRANSJ CARE MGMT HIGH F2F 7D: CPT

## 2018-07-17 RX ORDER — LISINOPRIL 5 MG/1
5 TABLET ORAL DAILY
Qty: 90 | Refills: 3 | Status: COMPLETED | COMMUNITY
Start: 2018-05-07 | End: 2018-07-17

## 2018-07-24 PROBLEM — Z78.9 ALCOHOL USE: Status: ACTIVE | Noted: 2018-03-05

## 2018-09-26 ENCOUNTER — RX RENEWAL (OUTPATIENT)
Age: 78
End: 2018-09-26

## 2018-10-02 ENCOUNTER — APPOINTMENT (OUTPATIENT)
Dept: HOME HEALTH SERVICES | Facility: HOME HEALTH | Age: 78
End: 2018-10-02
Payer: MEDICARE

## 2018-10-02 VITALS
RESPIRATION RATE: 16 BRPM | HEART RATE: 59 BPM | SYSTOLIC BLOOD PRESSURE: 160 MMHG | DIASTOLIC BLOOD PRESSURE: 70 MMHG | OXYGEN SATURATION: 96 %

## 2018-10-02 DIAGNOSIS — N18.3 CHRONIC KIDNEY DISEASE, STAGE 3 (MODERATE): ICD-10-CM

## 2018-10-02 DIAGNOSIS — G30.8 OTHER ALZHEIMER'S DISEASE: ICD-10-CM

## 2018-10-02 DIAGNOSIS — J30.9 ALLERGIC RHINITIS, UNSPECIFIED: ICD-10-CM

## 2018-10-02 DIAGNOSIS — Z23 ENCOUNTER FOR IMMUNIZATION: ICD-10-CM

## 2018-10-02 DIAGNOSIS — I25.10 ATHEROSCLEROTIC HEART DISEASE OF NATIVE CORONARY ARTERY W/OUT ANGINA PECTORIS: ICD-10-CM

## 2018-10-02 DIAGNOSIS — E78.00 PURE HYPERCHOLESTEROLEMIA, UNSPECIFIED: ICD-10-CM

## 2018-10-02 DIAGNOSIS — I10 ESSENTIAL (PRIMARY) HYPERTENSION: ICD-10-CM

## 2018-10-02 DIAGNOSIS — F02.81 OTHER ALZHEIMER'S DISEASE: ICD-10-CM

## 2018-10-02 PROCEDURE — 99350 HOME/RES VST EST HIGH MDM 60: CPT | Mod: 25

## 2018-10-02 PROCEDURE — 90662 IIV NO PRSV INCREASED AG IM: CPT

## 2018-10-02 PROCEDURE — G0008: CPT

## 2018-10-02 RX ORDER — FUROSEMIDE 20 MG/1
20 TABLET ORAL DAILY
Qty: 30 | Refills: 4 | Status: COMPLETED | COMMUNITY
Start: 2018-05-24 | End: 2018-10-02

## 2018-10-02 RX ORDER — LISINOPRIL 40 MG/1
40 TABLET ORAL DAILY
Qty: 30 | Refills: 5 | Status: ACTIVE | COMMUNITY
Start: 2017-08-03

## 2018-10-02 RX ORDER — LEVOFLOXACIN 500 MG/1
500 TABLET, FILM COATED ORAL
Refills: 0 | Status: COMPLETED | COMMUNITY
Start: 2018-07-17 | End: 2018-10-02

## 2018-10-02 RX ORDER — RIVASTIGMINE 4.6 MG/24H
4.6 PATCH, EXTENDED RELEASE TRANSDERMAL
Refills: 5 | Status: ACTIVE | COMMUNITY
Start: 2018-10-02

## 2018-10-02 RX ORDER — FLUTICASONE PROPIONATE 50 UG/1
50 SPRAY, METERED NASAL DAILY
Qty: 1 | Refills: 5 | Status: ACTIVE | COMMUNITY
Start: 2018-10-02 | End: 1900-01-01

## 2018-10-02 RX ORDER — AMLODIPINE BESYLATE 10 MG/1
10 TABLET ORAL DAILY
Qty: 90 | Refills: 2 | Status: ACTIVE | COMMUNITY
Start: 2017-08-03 | End: 1900-01-01

## 2018-11-08 ENCOUNTER — APPOINTMENT (OUTPATIENT)
Dept: HOME HEALTH SERVICES | Facility: HOME HEALTH | Age: 78
End: 2018-11-08

## 2019-03-01 ENCOUNTER — RX RENEWAL (OUTPATIENT)
Age: 79
End: 2019-03-01

## 2019-03-12 ENCOUNTER — RX RENEWAL (OUTPATIENT)
Age: 79
End: 2019-03-12

## 2019-03-12 RX ORDER — ATORVASTATIN CALCIUM 40 MG/1
40 TABLET, FILM COATED ORAL
Qty: 90 | Refills: 3 | Status: ACTIVE | COMMUNITY
Start: 2017-08-03 | End: 1900-01-01

## 2019-06-03 NOTE — ED ADULT NURSE NOTE - NS ED NURSE RECORD ANOTHER VITAL SIGN
Subjective:       Patient ID: Christina Lynette Lejeune is a 37 y.o. female.    Chief Complaint: Insect Bite    HPI    Pt here with complaints of wasp sting that occurred on yesterday. She admits to constantly scratching the affected area. The area swelled, became hot/tender, and the redness spread. She denies fever/drainage. No systemic complaints.    Past Medical History:   Diagnosis Date    Diabetes mellitus, type 2     Hyperlipidemia     Hypertension     Hypothyroidism     Thyroid disease      Past Surgical History:   Procedure Laterality Date    FOOT SURGERY      Pin inserted to heal     Social History     Socioeconomic History    Marital status:      Spouse name: Not on file    Number of children: Not on file    Years of education: Not on file    Highest education level: Not on file   Occupational History    Not on file   Social Needs    Financial resource strain: Not on file    Food insecurity:     Worry: Not on file     Inability: Not on file    Transportation needs:     Medical: Not on file     Non-medical: Not on file   Tobacco Use    Smoking status: Never Smoker    Smokeless tobacco: Never Used   Substance and Sexual Activity    Alcohol use: Yes     Comment: occasion    Drug use: No    Sexual activity: Yes     Partners: Male   Lifestyle    Physical activity:     Days per week: Not on file     Minutes per session: Not on file    Stress: Not on file   Relationships    Social connections:     Talks on phone: Not on file     Gets together: Not on file     Attends Cheondo[employer] service: Not on file     Active member of club or organization: Not on file     Attends meetings of clubs or organizations: Not on file     Relationship status: Not on file   Other Topics Concern    Not on file   Social History Narrative    Not on file     Review of patient's allergies indicates:   Allergen Reactions    Sulfa (sulfonamide antibiotics) Hives     Current Outpatient Medications   Medication Sig     benazepril-hydrochlorthiazide (LOTENSIN HCT) 20-25 mg Tab Take 1 tablet by mouth once daily.    desogestrel-ethinyl estradiol (APRI) 0.15-0.03 mg per tablet Take 1 tablet by mouth once daily.     levothyroxine (SYNTHROID) 100 MCG tablet Take 100 mcg by mouth once daily.    meloxicam (MOBIC) 15 MG tablet Take 15 mg by mouth once daily.    metformin (GLUCOPHAGE) 500 MG tablet Take 500 mg by mouth 2 (two) times daily with meals.     pravastatin (PRAVACHOL) 80 MG tablet Take 80 mg by mouth once daily.    triamcinolone (NASACORT) 55 mcg nasal inhaler 2 sprays by Nasal route once daily.    doxycycline (MONODOX) 100 MG capsule Take 1 capsule (100 mg total) by mouth 2 (two) times daily. for 10 days     No current facility-administered medications for this visit.            Review of Systems   Constitutional: Negative for activity change, appetite change, chills, diaphoresis, fatigue, fever and unexpected weight change.   HENT: Negative for congestion, ear pain, postnasal drip, rhinorrhea, sinus pressure, sinus pain, sneezing, sore throat, tinnitus, trouble swallowing and voice change.    Eyes: Negative for photophobia, pain and visual disturbance.   Respiratory: Negative for cough, chest tightness, shortness of breath and wheezing.    Cardiovascular: Negative for chest pain, palpitations and leg swelling.   Gastrointestinal: Negative for abdominal distention, abdominal pain, constipation, diarrhea, nausea and vomiting.   Genitourinary: Negative for decreased urine volume, difficulty urinating, dysuria, flank pain, frequency, hematuria and urgency.   Musculoskeletal: Negative for arthralgias, back pain, joint swelling, neck pain and neck stiffness.   Skin: Positive for color change.   Allergic/Immunologic: Negative for immunocompromised state.   Neurological: Negative for dizziness, tremors, seizures, syncope, facial asymmetry, speech difficulty, weakness, light-headedness, numbness and headaches.   Hematological:  Negative for adenopathy. Does not bruise/bleed easily.   Psychiatric/Behavioral: Negative for confusion and sleep disturbance.       Objective:      Physical Exam   Skin: There is erythema (tenderness, no sign of abscess formation, warm to touch).            Assessment:     Vitals:    06/03/19 1357   BP: 126/82   Pulse: 84   Temp: 97.6 °F (36.4 °C)         1. Cellulitis of left arm        Plan:   Cellulitis of left arm  -     doxycycline (MONODOX) 100 MG capsule; Take 1 capsule (100 mg total) by mouth 2 (two) times daily. for 10 days  Dispense: 20 capsule; Refill: 0        As above  AVS discussed in detail   Yes

## 2019-07-26 ENCOUNTER — LABORATORY RESULT (OUTPATIENT)
Age: 79
End: 2019-07-26

## 2019-08-14 ENCOUNTER — INPATIENT (INPATIENT)
Facility: HOSPITAL | Age: 79
LOS: 0 days | Discharge: HOME | End: 2019-08-15
Attending: HOSPITALIST | Admitting: HOSPITALIST
Payer: MEDICARE

## 2019-08-14 VITALS
DIASTOLIC BLOOD PRESSURE: 68 MMHG | OXYGEN SATURATION: 99 % | RESPIRATION RATE: 18 BRPM | HEART RATE: 60 BPM | SYSTOLIC BLOOD PRESSURE: 117 MMHG | TEMPERATURE: 98 F

## 2019-08-14 DIAGNOSIS — F03.90 UNSPECIFIED DEMENTIA WITHOUT BEHAVIORAL DISTURBANCE: ICD-10-CM

## 2019-08-14 DIAGNOSIS — L50.9 URTICARIA, UNSPECIFIED: ICD-10-CM

## 2019-08-14 DIAGNOSIS — L56.8 OTHER SPECIFIED ACUTE SKIN CHANGES DUE TO ULTRAVIOLET RADIATION: ICD-10-CM

## 2019-08-14 DIAGNOSIS — N17.9 ACUTE KIDNEY FAILURE, UNSPECIFIED: ICD-10-CM

## 2019-08-14 DIAGNOSIS — I25.10 ATHEROSCLEROTIC HEART DISEASE OF NATIVE CORONARY ARTERY WITHOUT ANGINA PECTORIS: ICD-10-CM

## 2019-08-14 DIAGNOSIS — Z95.5 PRESENCE OF CORONARY ANGIOPLASTY IMPLANT AND GRAFT: Chronic | ICD-10-CM

## 2019-08-14 DIAGNOSIS — I87.2 VENOUS INSUFFICIENCY (CHRONIC) (PERIPHERAL): ICD-10-CM

## 2019-08-14 DIAGNOSIS — E78.5 HYPERLIPIDEMIA, UNSPECIFIED: ICD-10-CM

## 2019-08-14 DIAGNOSIS — I10 ESSENTIAL (PRIMARY) HYPERTENSION: ICD-10-CM

## 2019-08-14 DIAGNOSIS — Z29.9 ENCOUNTER FOR PROPHYLACTIC MEASURES, UNSPECIFIED: ICD-10-CM

## 2019-08-14 DIAGNOSIS — E87.6 HYPOKALEMIA: ICD-10-CM

## 2019-08-14 LAB
ALBUMIN SERPL ELPH-MCNC: 3.9 G/DL — SIGNIFICANT CHANGE UP (ref 3.3–5)
ALP SERPL-CCNC: 64 U/L — SIGNIFICANT CHANGE UP (ref 40–120)
ALT FLD-CCNC: 22 U/L — SIGNIFICANT CHANGE UP (ref 4–41)
ANION GAP SERPL CALC-SCNC: 12 MMO/L — SIGNIFICANT CHANGE UP (ref 7–14)
ANION GAP SERPL CALC-SCNC: 13 MMO/L — SIGNIFICANT CHANGE UP (ref 7–14)
APPEARANCE UR: CLEAR — SIGNIFICANT CHANGE UP
AST SERPL-CCNC: 27 U/L — SIGNIFICANT CHANGE UP (ref 4–40)
BASOPHILS # BLD AUTO: 0.06 K/UL — SIGNIFICANT CHANGE UP (ref 0–0.2)
BASOPHILS NFR BLD AUTO: 0.7 % — SIGNIFICANT CHANGE UP (ref 0–2)
BILIRUB SERPL-MCNC: 0.7 MG/DL — SIGNIFICANT CHANGE UP (ref 0.2–1.2)
BILIRUB UR-MCNC: NEGATIVE — SIGNIFICANT CHANGE UP
BLOOD UR QL VISUAL: NEGATIVE — SIGNIFICANT CHANGE UP
BUN SERPL-MCNC: 17 MG/DL — SIGNIFICANT CHANGE UP (ref 7–23)
BUN SERPL-MCNC: 17 MG/DL — SIGNIFICANT CHANGE UP (ref 7–23)
CALCIUM SERPL-MCNC: 8.9 MG/DL — SIGNIFICANT CHANGE UP (ref 8.4–10.5)
CALCIUM SERPL-MCNC: 9.1 MG/DL — SIGNIFICANT CHANGE UP (ref 8.4–10.5)
CHLORIDE SERPL-SCNC: 100 MMOL/L — SIGNIFICANT CHANGE UP (ref 98–107)
CHLORIDE SERPL-SCNC: 104 MMOL/L — SIGNIFICANT CHANGE UP (ref 98–107)
CO2 SERPL-SCNC: 25 MMOL/L — SIGNIFICANT CHANGE UP (ref 22–31)
CO2 SERPL-SCNC: 26 MMOL/L — SIGNIFICANT CHANGE UP (ref 22–31)
COLOR SPEC: COLORLESS — SIGNIFICANT CHANGE UP
CREAT SERPL-MCNC: 2.39 MG/DL — HIGH (ref 0.5–1.3)
CREAT SERPL-MCNC: 2.57 MG/DL — HIGH (ref 0.5–1.3)
CRP SERPL-MCNC: 4.2 MG/L — SIGNIFICANT CHANGE UP
EOSINOPHIL # BLD AUTO: 0.91 K/UL — HIGH (ref 0–0.5)
EOSINOPHIL NFR BLD AUTO: 10.3 % — HIGH (ref 0–6)
ERYTHROCYTE [SEDIMENTATION RATE] IN BLOOD: 12 MM/HR — SIGNIFICANT CHANGE UP (ref 1–15)
GLUCOSE SERPL-MCNC: 118 MG/DL — HIGH (ref 70–99)
GLUCOSE SERPL-MCNC: 209 MG/DL — HIGH (ref 70–99)
GLUCOSE UR-MCNC: NEGATIVE — SIGNIFICANT CHANGE UP
HCT VFR BLD CALC: 42.4 % — SIGNIFICANT CHANGE UP (ref 39–50)
HGB BLD-MCNC: 14.5 G/DL — SIGNIFICANT CHANGE UP (ref 13–17)
IMM GRANULOCYTES NFR BLD AUTO: 0.2 % — SIGNIFICANT CHANGE UP (ref 0–1.5)
KETONES UR-MCNC: NEGATIVE — SIGNIFICANT CHANGE UP
LEUKOCYTE ESTERASE UR-ACNC: NEGATIVE — SIGNIFICANT CHANGE UP
LYMPHOCYTES # BLD AUTO: 1.66 K/UL — SIGNIFICANT CHANGE UP (ref 1–3.3)
LYMPHOCYTES # BLD AUTO: 18.8 % — SIGNIFICANT CHANGE UP (ref 13–44)
MCHC RBC-ENTMCNC: 30.9 PG — SIGNIFICANT CHANGE UP (ref 27–34)
MCHC RBC-ENTMCNC: 34.2 % — SIGNIFICANT CHANGE UP (ref 32–36)
MCV RBC AUTO: 90.4 FL — SIGNIFICANT CHANGE UP (ref 80–100)
MONOCYTES # BLD AUTO: 1.05 K/UL — HIGH (ref 0–0.9)
MONOCYTES NFR BLD AUTO: 11.9 % — SIGNIFICANT CHANGE UP (ref 2–14)
NEUTROPHILS # BLD AUTO: 5.15 K/UL — SIGNIFICANT CHANGE UP (ref 1.8–7.4)
NEUTROPHILS NFR BLD AUTO: 58.1 % — SIGNIFICANT CHANGE UP (ref 43–77)
NITRITE UR-MCNC: NEGATIVE — SIGNIFICANT CHANGE UP
NRBC # FLD: 0 K/UL — SIGNIFICANT CHANGE UP (ref 0–0)
PH UR: 7 — SIGNIFICANT CHANGE UP (ref 5–8)
PLATELET # BLD AUTO: 248 K/UL — SIGNIFICANT CHANGE UP (ref 150–400)
PMV BLD: 8.9 FL — SIGNIFICANT CHANGE UP (ref 7–13)
POTASSIUM SERPL-MCNC: 3.5 MMOL/L — SIGNIFICANT CHANGE UP (ref 3.5–5.3)
POTASSIUM SERPL-MCNC: 4.5 MMOL/L — SIGNIFICANT CHANGE UP (ref 3.5–5.3)
POTASSIUM SERPL-SCNC: 3.5 MMOL/L — SIGNIFICANT CHANGE UP (ref 3.5–5.3)
POTASSIUM SERPL-SCNC: 4.5 MMOL/L — SIGNIFICANT CHANGE UP (ref 3.5–5.3)
PROT SERPL-MCNC: 7.8 G/DL — SIGNIFICANT CHANGE UP (ref 6–8.3)
PROT UR-MCNC: NEGATIVE — SIGNIFICANT CHANGE UP
RBC # BLD: 4.69 M/UL — SIGNIFICANT CHANGE UP (ref 4.2–5.8)
RBC # FLD: 12.2 % — SIGNIFICANT CHANGE UP (ref 10.3–14.5)
SODIUM SERPL-SCNC: 139 MMOL/L — SIGNIFICANT CHANGE UP (ref 135–145)
SODIUM SERPL-SCNC: 141 MMOL/L — SIGNIFICANT CHANGE UP (ref 135–145)
SP GR SPEC: 1 — LOW (ref 1–1.04)
UROBILINOGEN FLD QL: NORMAL — SIGNIFICANT CHANGE UP
WBC # BLD: 8.85 K/UL — SIGNIFICANT CHANGE UP (ref 3.8–10.5)
WBC # FLD AUTO: 8.85 K/UL — SIGNIFICANT CHANGE UP (ref 3.8–10.5)

## 2019-08-14 PROCEDURE — 76770 US EXAM ABDO BACK WALL COMP: CPT | Mod: 26

## 2019-08-14 PROCEDURE — 93971 EXTREMITY STUDY: CPT | Mod: 26,LT

## 2019-08-14 PROCEDURE — 99223 1ST HOSP IP/OBS HIGH 75: CPT | Mod: AI,GC

## 2019-08-14 PROCEDURE — 99223 1ST HOSP IP/OBS HIGH 75: CPT | Mod: 25

## 2019-08-14 PROCEDURE — 11104 PUNCH BX SKIN SINGLE LESION: CPT | Mod: 59

## 2019-08-14 RX ORDER — ATORVASTATIN CALCIUM 80 MG/1
40 TABLET, FILM COATED ORAL AT BEDTIME
Refills: 0 | Status: DISCONTINUED | OUTPATIENT
Start: 2019-08-14 | End: 2019-08-15

## 2019-08-14 RX ORDER — RIVASTIGMINE 4.6 MG/24H
1 PATCH, EXTENDED RELEASE TRANSDERMAL
Qty: 0 | Refills: 0 | DISCHARGE

## 2019-08-14 RX ORDER — FAMOTIDINE 10 MG/ML
20 INJECTION INTRAVENOUS DAILY
Refills: 0 | Status: DISCONTINUED | OUTPATIENT
Start: 2019-08-15 | End: 2019-08-15

## 2019-08-14 RX ORDER — AMLODIPINE BESYLATE 2.5 MG/1
1 TABLET ORAL
Qty: 0 | Refills: 0 | DISCHARGE

## 2019-08-14 RX ORDER — AMLODIPINE BESYLATE 2.5 MG/1
10 TABLET ORAL DAILY
Refills: 0 | Status: DISCONTINUED | OUTPATIENT
Start: 2019-08-14 | End: 2019-08-15

## 2019-08-14 RX ORDER — GABAPENTIN 400 MG/1
1 CAPSULE ORAL
Qty: 0 | Refills: 0 | DISCHARGE

## 2019-08-14 RX ORDER — FAMOTIDINE 10 MG/ML
20 INJECTION INTRAVENOUS ONCE
Refills: 0 | Status: COMPLETED | OUTPATIENT
Start: 2019-08-14 | End: 2019-08-14

## 2019-08-14 RX ORDER — LISINOPRIL 2.5 MG/1
40 TABLET ORAL DAILY
Refills: 0 | Status: DISCONTINUED | OUTPATIENT
Start: 2019-08-14 | End: 2019-08-14

## 2019-08-14 RX ORDER — ATORVASTATIN CALCIUM 80 MG/1
1 TABLET, FILM COATED ORAL
Qty: 0 | Refills: 0 | DISCHARGE

## 2019-08-14 RX ORDER — ASPIRIN/CALCIUM CARB/MAGNESIUM 324 MG
1 TABLET ORAL
Qty: 0 | Refills: 0 | DISCHARGE

## 2019-08-14 RX ORDER — GABAPENTIN 400 MG/1
100 CAPSULE ORAL
Refills: 0 | Status: DISCONTINUED | OUTPATIENT
Start: 2019-08-14 | End: 2019-08-15

## 2019-08-14 RX ORDER — HEPARIN SODIUM 5000 [USP'U]/ML
5000 INJECTION INTRAVENOUS; SUBCUTANEOUS EVERY 8 HOURS
Refills: 0 | Status: DISCONTINUED | OUTPATIENT
Start: 2019-08-14 | End: 2019-08-14

## 2019-08-14 RX ORDER — FUROSEMIDE 40 MG
40 TABLET ORAL DAILY
Refills: 0 | Status: DISCONTINUED | OUTPATIENT
Start: 2019-08-14 | End: 2019-08-14

## 2019-08-14 RX ORDER — DIPHENHYDRAMINE HCL 50 MG
25 CAPSULE ORAL DAILY
Refills: 0 | Status: DISCONTINUED | OUTPATIENT
Start: 2019-08-15 | End: 2019-08-15

## 2019-08-14 RX ORDER — DIPHENHYDRAMINE HCL 50 MG
25 CAPSULE ORAL ONCE
Refills: 0 | Status: COMPLETED | OUTPATIENT
Start: 2019-08-14 | End: 2019-08-14

## 2019-08-14 RX ORDER — MUPIROCIN 20 MG/G
1 OINTMENT TOPICAL
Qty: 0 | Refills: 0 | DISCHARGE

## 2019-08-14 RX ORDER — SODIUM CHLORIDE 9 MG/ML
1000 INJECTION INTRAMUSCULAR; INTRAVENOUS; SUBCUTANEOUS ONCE
Refills: 0 | Status: COMPLETED | OUTPATIENT
Start: 2019-08-14 | End: 2019-08-14

## 2019-08-14 RX ORDER — ASPIRIN/CALCIUM CARB/MAGNESIUM 324 MG
81 TABLET ORAL DAILY
Refills: 0 | Status: DISCONTINUED | OUTPATIENT
Start: 2019-08-14 | End: 2019-08-15

## 2019-08-14 RX ORDER — AMLODIPINE BESYLATE 2.5 MG/1
10 TABLET ORAL DAILY
Refills: 0 | Status: DISCONTINUED | OUTPATIENT
Start: 2019-08-14 | End: 2019-08-14

## 2019-08-14 RX ORDER — SODIUM CHLORIDE 9 MG/ML
1000 INJECTION, SOLUTION INTRAVENOUS
Refills: 0 | Status: DISCONTINUED | OUTPATIENT
Start: 2019-08-14 | End: 2019-08-15

## 2019-08-14 RX ORDER — LISINOPRIL 2.5 MG/1
1 TABLET ORAL
Qty: 0 | Refills: 0 | DISCHARGE

## 2019-08-14 RX ADMIN — Medication 25 MILLIGRAM(S): at 10:14

## 2019-08-14 RX ADMIN — SODIUM CHLORIDE 1000 MILLILITER(S): 9 INJECTION INTRAMUSCULAR; INTRAVENOUS; SUBCUTANEOUS at 12:04

## 2019-08-14 RX ADMIN — SODIUM CHLORIDE 100 MILLILITER(S): 9 INJECTION, SOLUTION INTRAVENOUS at 23:03

## 2019-08-14 RX ADMIN — FAMOTIDINE 20 MILLIGRAM(S): 10 INJECTION INTRAVENOUS at 10:14

## 2019-08-14 RX ADMIN — ATORVASTATIN CALCIUM 40 MILLIGRAM(S): 80 TABLET, FILM COATED ORAL at 22:28

## 2019-08-14 RX ADMIN — GABAPENTIN 100 MILLIGRAM(S): 400 CAPSULE ORAL at 22:29

## 2019-08-14 RX ADMIN — Medication 60 MILLIGRAM(S): at 10:14

## 2019-08-14 NOTE — H&P ADULT - ASSESSMENT
79M with history of CKD stage 3, HTN, HLD, CAD s/p stent, and dementia who presents with 2 days of pruritis and hives on all four extremities, face, and chest along with new VITOR on top of CKD stage 3.

## 2019-08-14 NOTE — H&P ADULT - HISTORY OF PRESENT ILLNESS
Patient is an 79M [place] man with history of CKD with chronic hypokalemia, osteoarthritis, HTN, HLD, ventral hernia, CAD s/p stent x1, and dementia who presents with pruritis and urticarial rash x2 day duration on all 4 extremities and chest and eyelids.  He has some chronic skin changes and hyperpigmentation of his extremities consistent with dematitis that is long-standing.  He has a new, ~1 week history of RLE edema to his ankle. Patient is an 79 year old [place] man with history of CKD with chronic hypokalemia, osteoarthritis, HTN, HLD, ventral hernia, CAD s/p stent x1, and dementia who presents with pruritis and urticarial rash x2 day duration on all 4 extremities and chest and eyelids.  He has chronic skin changes and hyperpigmentation of his extremities consistent with dematitis that is long-standing.  He also has chronic edema of his extremities, worse in his RLE, currently unchanged from baseline, along with pain behind his right calf.  No CP, SOB, abdominal pain, diarrhea, constipation, trouble swallowing, headache, fevers, other complaints.    Patient notes recent travel to [facility] approximately 2 weeks ago where he visited his wife.

## 2019-08-14 NOTE — H&P ADULT - ATTENDING COMMENTS
Pt seen and examined by me Agree with resident  In brief this is a 79M PMH of  CKD 3, HTN, HLD, CAD s/p stent, and dementia, recent visit to [facility], returned 2 weeks back aw skin lesion, intermittent  pruritus  on  extremities, face, and chest along with new VITOR on  CKD stage 3.  clinical exam significant for dusky  erythematous,  coalesscent papules plaques on face, around eyes, back of neck , extensor of extremities with relative sparing of covered areas Associated periorbital edema.   RLE swelling > RLE. Signs of venous stasis present  # Dermatitis concerning for photodermatitis   Denies new drugs or new foods.Only recent travel to [facility]   Appreciate Derm- Advise Predni taper/Triamcinolone cream  continue Benadryl PRN   Recommend FU in Derm as outpt  # VITOR on CKD3 - likely pre-renal   As per daughter, pt with good urine outpt   Assess response to IVF   If worsening creatinine, advise to hold Lasix/Lisinopril and will obtain renal ultrasound  #H/o HTN- on Lisinopril/Lasix/Norvasc  # Venous stasis- RLE dopplers negative for DVT

## 2019-08-14 NOTE — ED ADULT NURSE NOTE - OBJECTIVE STATEMENT
Pt presents to ED with bilateral lower extremity swelling and redness. Pt states he also has bilateral arm swelling and redness. Redness and swelling noted to neck. Red spots noted to bialteral arms, legs, and face. Right lower extremity +4 edema. Pt AxOx3. pt denies chest pain, lightheadedness and dizziness. Pt denies shortness of breath, breathing even and unlabored. pt denies abd pain, n/v/d. pt denies dysuria and hematuria. Pt states he went to his PCP for the swelling in the right leg and was supposed to go for an ultrasound today. Pedal pulses noted bilaterally. Will continue to monitor. Pt presents to ED with bilateral lower extremity swelling and redness. Pt states he also has bilateral arm swelling and redness. Redness and swelling noted to neck. Red spots noted to bialteral arms, legs, and face. Right lower extremity +4 edema. Pt denies itchiness to the bilateral arms. Pt AxOx3. pt denies chest pain, lightheadedness and dizziness. Pt denies shortness of breath, breathing even and unlabored. pt denies abd pain, n/v/d. pt denies dysuria and hematuria. Pt states he went to his PCP for the swelling in the right leg and was supposed to go for an ultrasound today. Pedal pulses noted bilaterally. Will continue to monitor.

## 2019-08-14 NOTE — ED PROVIDER NOTE - PHYSICAL EXAMINATION
HDS, NAD, MMM, eyes clear, oropharynx without lesions or edema, no uvular edema, lungs with mild crackles at bilateral bases, no stridor, heart sounds normal, abd soft, NT, ND, no CVAT, RLE with 2+edema (chronic), LLE normal, wwp, skin with erythema and hives in BLE from feet to the thighs, BUE from hands to mid-upper arms, chest, eyelids, cheeks, neuro: alert and oriented, no focal deficits

## 2019-08-14 NOTE — H&P ADULT - NSHPPHYSICALEXAM_GEN_ALL_CORE
PHYSICAL EXAM:  Vitals: Tmax  HR  BP  RR  SpO2 @   GENERAL: in NAD, Sitting comfortably in bed, alert and cooperative, in no acute distress.  HEAD: Normocephalic, atraumatic, no hayes sign, no periorbital ecchymosis   EYES: EOMs intact b/l w/out deficits, anicteric, no discharge, vision grossly intact.  Mild periorbital erythema and edema.  ENMT: Moist membranes, no tongue swelling or oral erythema or urticarial lesions, no nasal discharge, oropharynx clear, dentures in place.  CV: RRR no murmur/gallops/rubs, normal S1 and S2 with no extra heart sounds appreciated, no cyanosis, pallor.  LUNGS: Non-labored breathing, CTAB no wheezes/rhonchi/rales, no diminished breath sounds, no accessory muscle use.  ABDOMEN:  Soft, nontender, nondistended, no guarding or rebound, no masses, no hepatosplenomegaly.  EXTREMITIES: Warm and well-perfused with no clubbing or cyanosis, 2+ RLE edema to ankle with 1+ to knee, pain on posterior calf.  NERVOUS SYSTEM:  CN II-XII grossly intact, A&Ox4, No motor deficits or sensory deficits to b/l UEs.  MSK: Normal tone and bulk.  Heme/LYMPH: No ecchymosis, bruising, or lymphadenopathy.  SKIN:  Warm and dry, with chronic skin changes and hyperpigmentation on 4 extremities consistent with dermatitis.  New urticarial rash in exposed areas including chest along shirt line, legs and arms, face.  PSYCH:  Normal mood, alert and oriented, shows appropriate judgement and insight.

## 2019-08-14 NOTE — H&P ADULT - PROBLEM SELECTOR PLAN 1
- Urticaria on 4 extremities + chest + face, sun exposed areas.  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 PMLE/chronic actinic dermatitis  - Prednisone taper per Derm + triamcinolone cream BID to arms + legs x 2 weeks  - Patient to f/u in Derm clinic in 2-3 weeks - Rash on 4 extremities + chest + face, sun exposed areas.  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 PMLE/chronic actinic dermatitis  - Prednisone taper per Derm + triamcinolone cream BID to arms + legs x 2 weeks  - Patient to f/u in Derm clinic in 2-3 weeks

## 2019-08-14 NOTE — ED PROVIDER NOTE - OBJECTIVE STATEMENT
80 y/o M PMHx CKD, HTN, CAD, dementia brought in by family for erythema and edema of all 4 extremities that they noted 4 days ago. Pt has a hx RLE edema chronically, but was supposed to go for a lower ext US today. Per granddaughter, pt had similar sx 1 year ago and was admitted for cellulitis. +chills at home, no fevers. Denies sob, chest pain, abdominal pain, dysuria or any other complaints. 80 y/o M PMHx CKD, HTN, CAD, dementia brought in by family for pruritis and hives of all 4 extremities x 2d. Pt has a hx RLE edema chronically, but was supposed to go for a lower ext US today. +chills at home, no fevers. Denies sob, chest pain, abdominal pain, vomiting, throat swelling, tongue or lip swelling, dysuria or any other complaints.  No new products or foods.  Started using muporicin on the RLE for pruritis 8 days ago, but he has used this in the past without a problem. 80 y/o M PMHx CKD, HTN, CAD, dementia brought in by family for pruritis and hives of all 4 extremities x 2d.  Family reports no resolution of any lesions - once there, it has stayed and new lesions have formed.  Pt has a hx RLE edema chronically, but was supposed to go for a lower ext US today. +chills at home, no fevers. Denies sob, chest pain, abdominal pain, vomiting, throat swelling, tongue or lip swelling, dysuria or any other complaints.  No new products or foods.  Started using muporicin on the RLE for pruritis 8 days ago, but he has used this in the past without a problem.

## 2019-08-14 NOTE — PATIENT PROFILE ADULT - NSPROGENDIFFINTUB_GEN_A_NUR
Progress Notes by Leonardo Chua MD at 01/14/17 11:33 AM     Author:  Leonardo Chua MD Service:  (none) Author Type:  Physician     Filed:  01/14/17 11:39 AM Encounter Date:  1/13/2017 Status:  Signed     :  Leonardo Chua MD (Physician)            CC:   Chief Complaint    Patient presents with    • Lump In Throat       Visit:Initial    SUBJECTIVE:  Esther Collier is a 51 year old female who presents today for throat pain/irritation. This is associated with globus sensation (especially while swallowing) L>R,  frequent throat clearing, but no heartburn. No voice changes noted. No issues with vocal endurance, breathiness.  No SOB, hemoptysis.  She has a history of tonsil stones as well, and is a previous patient of Dr. Zimmer.  She states she frequently has white spots that she picks out of her tonsils.[CM1.1C] She has not been able to do this recently.[CM1.1M]  She denies halitosis.   In 2012, patient had total thyroidectomy for papillary thyroid carcinoma. She continues to need high dose calcium supplementation despite normal PTH in 2014 (last time it was checked).  Calcium rechecked earlier this year and was in low normal range.   Previous records reviewed including old notes, operative reports, and pathology reports.[CM1.1C]     At last visit, we started PPI without improvement of her symptoms.  She continues to complain of throat pain, especially on left, and this causes positional SOB when laying on certain side. She recently had U/s neck, which was WNL.  She is not a smoker.[CM1.1M]      REVIEW OF SYSTEMS:   --General: Pt denies problems with fever, night sweats, weight changes, appetite changes and sleep problems  --HEET: see HPI  --Respiratory: Pt denies problems with coughing, wheezing, changes in voice,  nor shortness of breath    --Cardiovascular: Pt denies problems with chest pain, palpitations or other cardiac complaints  --Gastrointestinal: Pt denies problems with diarrhea, constipation,  abdominal pain or other complaints  --Genitourinary: Pt denies problems with urinary pain, bleeding and voiding problems  --Musculoskeletal: Pt denies problems with pain, swelling, weakness or limitation of motion  --Neurologic:Pt denies problems with syncope, seizures, paralysis, involuntary movements or gait  --Psychiatric: Pt denies problems with sleep, anxiety, or depression  --Hematologic/Lymphatic/Immunologic: Pt. denies hematological, lymphatic and immunological problems  --Skin: No problems with scalp lesions. No rash    Past Medical History:[CM1.1C]  Past Medical History      Diagnosis   Date   • Abnormal Papanicolaou smear of cervix and cervical HPV       LGSIL for 3 years had cryo and leep, hyst,     • Constipation     • Genital herpes, unspecified       valtrex prn/ zovirax     • Hemorrhoids, internal     • Papillary thyroid carcinoma (HCC)     • Vaginal high risk human papillomavirus (HPV) DNA test positive[CM1.1T]         Patient Active Problem List    Diagnosis    • Keratoconus, unspecified   • Genital herpes, unspecified   • Family history of diabetes mellitus   • Hypothyroidism   • Papillary thyroid carcinoma (HCC)   • Vitamin D deficiency   • Internal hemorrhoid   • Hypocalcemia   • Postsurgical hypoparathyroidism (HCC)         Past Surgical History:[CM1.1C]   Past Surgical History       Procedure   Laterality Date   • Total hysterectomy        fibroids/bleeding     • Myomectomy, excis fibroid yobany o      •  delivery only      • Thyroidectomy   10/12         Allergies     Allergen  Reactions   • Latex Other - See Comments[CM1.1T]       Medications:   Current outpatient prescriptions prior to encounter       Medication  Sig Dispense Refill   • nystatin (MYCOSTATIN) ointment Twice a day for up to one week then prn 30 g 1   • SYNTHROID 100 MCG tablet Take 1 Tab by mouth daily. Monday through Saturday and ONE HALF tab on Sundays. 90 Tab 2   • acyclovir (ZOVIRAX) 5 % ointment apply 6  times daily for 7 days 30 g 11   • Multiple Minerals-Vitamins (CALCIUM CITRATE PLUS) TABS Take 600 mg by mouth. Take 3 tabs daily. 80 Tab 0   • B Complex Vitamins (B COMPLEX OR) Take 1 Tab by mouth 3 (three) times a week.[CM1.1C]         Social History     Social History      • Marital status:       Spouse name: N/A   • Number of children:  N/A   • Years of education:  N/A     Occupational History      • consultant           • sub teacher       Social History Main Topics       • Smoking status:   Never Smoker   • Smokeless tobacco:   Never Used   • Alcohol use   No   • Drug use:   No   • Sexual activity:   Yes     Partners:  Male      Comment: hyst      Other Topics  Concern   • Not on file      Social History Narrative       Family History      Problem  Relation Age of Onset   • Cancer None    • Diabetes Mother    • High Blood Pressure Mother    • Cancer Father[CM1.1T]        PHYSICAL EXAMINATION:     --General appearance: Well developed, well nourished, in no apparent distress  --Ability to communicate: Appropriate  --Head and scalp: No scalp lesions  --Eyes: No redness, swelling or drainage.  --Ears:    R ear: EAC patent, tympanic membrane intact, no middle ear effusion    L ear: EAC patent, tympanic membrane intact, no middle ear effusion  --Oral Cavity/Oral Pharynx: Tonsils 2+ and mildly cryptic, no obvious tonsilliths. No buccal mucosal lesions, palatal elevation symmetric, tongue motion intact, no floor of mouth palpable masses, no posterior pharyngeal wall fullness  --Nose: Nares patent, no rhinorrhea or epistaxis. Septum midline, no turbinate hypertrophy.  --Neck: Trachea midline, Supple. Previous thyroidectomy scar, no obvious palpable masses.  --Skin: No pigmented lesions on face, neck  --Psychiatric: Oriented to time, place and person  --Lymphatic: No palpable cervical adenopathy     Procedure Note:Flexible fiberoptic laryngoscopy  Patient was topically vasoconstricted and  anesthetized with afrin and  2% lidocaine respectively.   Left side: Nasopharynx clear, no purulence, no polyps, base of tongue appears normal, supraglottic larynx normal, pyriform sinuses clear bilaterally, vocal cord mobility and morphology intact bilaterally.  TVFs with mild edema, but mucosal waves normal.  Mild erythema noted posteriorly. No polyps or nodules.[CM1.1C]     Lab Results      Component  Value Date    FRT4 1.02 08/31/2015    FRT4 1.16 09/08/2014    FRT4 1.13 04/08/2014    TSH 0.46 01/09/2017    TSH 0.89 12/07/2016    TSH 0.28 06/28/2016    TSH 12.30 07/31/2012    TSH 14.40 07/25/2012    TSH < 0.02 06/26/2012[CM1.1T]     PTH: WNL, Calcium WNL[CM1.1M]    FINDINGS:   There has been prior thyroidectomy and there is no residual or  recurrent thyroid tissue or mass at the thyroidectomy bed.      At site of palpable abnormality at the left neck in the submandibular  region, no focal solid or cystic mass lesion is identified.      There is no ultrasound evidence of cervical lymphadenopathy in the  right or left neck.         IMPRESSION:   Status post thyroidectomy with no definite solid or cystic mass lesion  identified at site of palpable abnormality in the left submandibular  region. Consider further evaluation with enhanced CT of the soft  tissues of the neck as clinically warranted.    ASSESSMENT:[CM1.1C]  Throat pain[CM1.1M]  Tonsilliths  Hx of papillary thyroid carcinoma s/p total thyroidectomy in 2012 and CHOUDHARY treatment    PLAN:  - Discussed lifestyle changes with patient including avoiding lying down for >2 hours after meals, avoid fatty/greasy foods, and limited intake of spicy foods, alcohol, coffee, peppermint, chocolate, smoking. In addition, recommended elevation of HOB to 30 degrees.[CM1.1C]  - Will stop medication, as no benefit elicited from daily use.[CM1.1M]   -Recommend frequent mouth rinsing and use of Waterpik for chronic tonsil stones  - Explained to patient about the indications of a  tonsillectomy for chronic tonsillitis versus tonsil stones. Tonsilliths are a relative indication for tonsillectomy if chronic problems develop  - RTC 6 weeks for recheck  -[CM1.1C] I reviewed U/s and recent labs with patient and provided assurance that this was negative for recurrence of her thyroid cancer  - Will check CT neck with and without contrast given persistent throat pain  - RTC after CT for review[CM1.1M]  - Discussed with patient treatment plan as above, and patient agreeable.  All patient's questions and concerns were addressed and answered.     ASHOK Chua MD[CM1.1C]          Revision History        User Key Date/Time User Provider Type Action    > CM1.1 01/14/17 11:39 AM Leonardo Chua MD Physician Sign    C - Copied, M - Manual, T - Template             never intubated

## 2019-08-14 NOTE — CONSULT NOTE ADULT - ASSESSMENT
# Photodistributed dermatosis  Ddx includes photoallergic dermatitis vs. less likely PMLE/chronic actinic dermatitis  - would start pred 40mg QD x 6 days, 30mg QD x 6 days, 20mg QD x 6 days, 10mg QD x 6 days, the noff  - start triamcinolone 0.1% oint BID to affected areas on arms + legs x 2 weeks    - no acute indications for inpatient admission from Derm perspective    - pt can f/u in Derm clinic in 2-3 weeks. Please provide info at time of discharge:  [phone]  [address], [address], [place], [place]  [address] [address]    Patient seen at bedside with attending Dr. Castellano. Please call [phone] with any questions.    Solaneg Caldera MD  PGY3 Dermatology # Photodistributed dermatosis  Ddx includes photoallergic dermatitis vs. less likely PMLE/chronic actinic dermatitis  - would start pred 40mg QD x 6 days, 30mg QD x 6 days, 20mg QD x 6 days, 10mg QD x 6 days, then off  - start triamcinolone 0.1% oint BID to affected areas on arms + legs x 2 weeks    - no acute indications for inpatient admission from Derm perspective    - pt can f/u in Derm clinic in 2-3 weeks. Please provide info at time of discharge:  [phone]  [address], [address], [place], [place]  [address] [address]    Patient seen at bedside with attending Dr. Castellano. Please call [phone] with any questions.    Solange Caldera MD  PGY3 Dermatology # Photodistributed dermatosis  Ddx includes photoallergic dermatitis vs. less likely PMLE/chronic actinic dermatitis. B/l LE with likely venous stasis dermatitis  - would start pred 40mg QD x 6 days, 30mg QD x 6 days, 20mg QD x 6 days, 10mg QD x 6 days, then off  - start triamcinolone 0.1% oint BID to affected areas on arms + legs x 2 weeks    - no acute indications for inpatient admission from Derm perspective    - pt can f/u in Derm clinic in 2-3 weeks. Please provide info at time of discharge:  [phone]  [address], [address], [place], [place]  [address] [address]    Patient seen at bedside with attending Dr. Castellano. Please call [phone] with any questions.    Solange Caldera MD  PGY3 Dermatology

## 2019-08-14 NOTE — ED PROVIDER NOTE - CARE PLAN
Principal Discharge DX:	Allergic reaction Principal Discharge DX:	Urticaria  Secondary Diagnosis:	VITOR (acute kidney injury)

## 2019-08-14 NOTE — ED PROVIDER NOTE - PROGRESS NOTE DETAILS
Cabot: Patient found to have VITOR on stage 3 CKD.  Will hydrate and repeat the BMP. DALE Barros: Pt evaluated by CDU for gentle hydration, derm eval and rpt labs in the am. State pt cannot go to CDU 2/2 mental status, AOX2. Concern for elevated Cr (higher then baseline) due to suspicion of urticarial vasculitis. PMD Millicent Slater. Will admit to hospitalist.

## 2019-08-14 NOTE — CONSULT NOTE ADULT - SUBJECTIVE AND OBJECTIVE BOX
HPI:  78 y/o M h/o CKD (baseline Cr 1.4s), HTN, CAD, mild dementia presenting to ED for pruritus and hives x 2 days. Lesions have been persistent. No fevers, abd pain, dysuria. No new medications in last 6 months. Started using mupirocin by PMD for RLE x 7 days. Given pred, Benadryl, famotidine in ED. No recent excess sun exposure.     PAST MEDICAL & SURGICAL HISTORY:  Ventral Hernia: without obstruction  Chronic Hypokalemia: x 3 yrs- on potassium supplement at home  History of Osteoarthritis  CKD (Chronic Kidney Disease): diagnosed 3 yrs ago- being followed by nephrologist  Hypercholesterolemia  HTN (Hypertension)  S/P coronary artery stent placement  History of Hemorrhoidectomy: &#x27;s      REVIEW OF SYSTEMS    General: no fevers/chills, no lethargy	    Skin/Breast: see HPI  	  Ophthalmologic: no eye pain or change in vision  	  ENMT: no dysphagia or change in hearing    Respiratory and Thorax: no SOB or cough  	  Cardiovascular: no palpitations or chest pain    Gastrointestinal: no abdominal pain or blood in stool     Genitourinary: no dysuria or frequency    Musculoskeletal: no joint pains    Neurological: no weakness, numbness , or tingling    MEDICATIONS  (STANDING):    MEDICATIONS  (PRN):      Allergies    No Known Allergies    Intolerances        SOCIAL HISTORY:    FAMILY HISTORY:  No pertinent family history in first degree relatives      Vital Signs Last 24 Hrs  T(C): 36.8 (14 Aug 2019 12:57), Max: 36.8 (14 Aug 2019 12:57)  T(F): 98.2 (14 Aug 2019 12:57), Max: 98.2 (14 Aug 2019 12:57)  HR: 54 (14 Aug 2019 12:57) (54 - 60)  BP: 120/56 (14 Aug 2019 12:57) (117/68 - 120/56)  BP(mean): --  RR: 16 (14 Aug 2019 12:57) (16 - 18)  SpO2: 96% (14 Aug 2019 12:57) (96% - 99%)    PHYSICAL EXAM:     The patient was alert and oriented X 3, well nourished, and in no  apparent distress.  OP showed no ulcerations  There was no visible lymphadenopathy.  Conjunctiva were non injected  There was no clubbing or edema of extremities.  The scalp, hair, face, eyebrows, lips, OP, neck, chest, back,   extremities X 4, nails were examined.  There was no hyperhidrosis or bromhidrosis.    Of note on skin exam:   - b/l extensor forearms, upper anterior neck/chest, b/l periocular region - erythematous eczematous plaques with overlying collaretts of scale  - b/l LE: circumferential erythematous patches/plaques with hyperpigmentation and mild induration    LABS:                        14.5   8.85  )-----------( 248      ( 14 Aug 2019 10:24 )             42.4     08-14    139  |  100  |  17  ----------------------------<  118<H>  3.5   |  26  |  2.57<H>    Ca    9.1      14 Aug 2019 10:24    TPro  7.8  /  Alb  3.9  /  TBili  0.7  /  DBili  x   /  AST  27  /  ALT  22  /  AlkPhos  64  08-14      Urinalysis Basic - ( 14 Aug 2019 12:33 )    Color: COLORLESS / Appearance: CLEAR / S.004 / pH: 7.0  Gluc: NEGATIVE / Ketone: NEGATIVE  / Bili: NEGATIVE / Urobili: NORMAL   Blood: NEGATIVE / Protein: NEGATIVE / Nitrite: NEGATIVE   Leuk Esterase: NEGATIVE / RBC: x / WBC x   Sq Epi: x / Non Sq Epi: x / Bacteria: x        RADIOLOGY & ADDITIONAL STUDIES:  RLE U/S negative for DVT

## 2019-08-14 NOTE — H&P ADULT - PROBLEM SELECTOR PLAN 2
Baseline Cr ~1.42, currently 2.57   - Received 1L NS in ED, will evaluate response to determine pre-renal component   - Kidney and bladder u/s, urine lytes, urine Cr, urea, protein

## 2019-08-14 NOTE — H&P ADULT - PROBLEM SELECTOR PLAN 3
Patient takes potassium supplementation daily, scheduled to see nephrologist Abhijeet.   - Will supplement, K>4

## 2019-08-14 NOTE — ED ADULT TRIAGE NOTE - CHIEF COMPLAINT QUOTE
Patient and family at bedside reports having bilateral lower extremity swelling "for a long time" and bilateral arm swelling since Monday. Arms appear reddened and dry.

## 2019-08-14 NOTE — H&P ADULT - NSICDXPASTMEDICALHX_GEN_ALL_CORE_FT
PAST MEDICAL HISTORY:  Chronic Hypokalemia x 3 yrs- on potassium supplement at home    CKD (Chronic Kidney Disease) diagnosed 3 yrs ago- being followed by nephrologist    History of Osteoarthritis     HTN (Hypertension)     Hypercholesterolemia     Ventral Hernia without obstruction

## 2019-08-14 NOTE — ED PROVIDER NOTE - CLINICAL SUMMARY MEDICAL DECISION MAKING FREE TEXT BOX
Cabot: 79M with complex PMH, here with pruritis and hives of all 4 extremities x 2d. Pt has a hx RLE edema chronically, but was supposed to go for a lower ext US today. Denies sob, chest pain, abdominal pain, vomiting, throat swelling, tongue or lip swelling, dysuria or any other complaints.  On exam, hives in the extremities, chest, and eyelids and cheeks.  No sign of anaphylaxis.  Will tx with steroids, H2B, benadryl, refer to allergy and derm.

## 2019-08-14 NOTE — H&P ADULT - PROBLEM SELECTOR PLAN 4
- Home dose lasix 40mg oral  - Will be cautious initially with fluids, given unclear kidney pathology and history of edema.  Re-evaluate after repeat BMP and response

## 2019-08-14 NOTE — H&P ADULT - NSHPLABSRESULTS_GEN_ALL_CORE
CBC Full  -  ( 14 Aug 2019 10:24 )  WBC Count : 8.85 K/uL  RBC Count : 4.69 M/uL  Hemoglobin : 14.5 g/dL  Hematocrit : 42.4 %  Platelet Count - Automated : 248 K/uL  Mean Cell Volume : 90.4 fL  Mean Cell Hemoglobin : 30.9 pg  Mean Cell Hemoglobin Concentration : 34.2 %  Auto Neutrophil # : 5.15 K/uL  Auto Lymphocyte # : 1.66 K/uL  Auto Monocyte # : 1.05 K/uL  Auto Eosinophil # : 0.91 K/uL  Auto Basophil # : 0.06 K/uL  Auto Neutrophil % : 58.1 %  Auto Lymphocyte % : 18.8 %  Auto Monocyte % : 11.9 %  Auto Eosinophil % : 10.3 %  Auto Basophil % : 0.7 %        139  |  100  |  17  ----------------------------<  118<H>  3.5   |  26  |  2.57<H>    Ca    9.1      14 Aug 2019 10:24    TPro  7.8  /  Alb  3.9  /  TBili  0.7  /  DBili  x   /  AST  27  /  ALT  22  /  AlkPhos  64  08-14      Urinalysis Basic - ( 14 Aug 2019 12:33 )    Color: COLORLESS / Appearance: CLEAR / S.004 / pH: 7.0  Gluc: NEGATIVE / Ketone: NEGATIVE  / Bili: NEGATIVE / Urobili: NORMAL   Blood: NEGATIVE / Protein: NEGATIVE / Nitrite: NEGATIVE   Leuk Esterase: NEGATIVE / RBC: x / WBC x   Sq Epi: x / Non Sq Epi: x / Bacteria: x

## 2019-08-14 NOTE — ED ADULT NURSE NOTE - NSIMPLEMENTINTERV_GEN_ALL_ED
Implemented All Universal Safety Interventions:  [place] to call system. Call bell, personal items and telephone within reach. Instruct patient to call for assistance. Room bathroom lighting operational. Non-slip footwear when patient is off stretcher. Physically safe environment: no spills, clutter or unnecessary equipment. Stretcher in lowest position, wheels locked, appropriate side rails in place.

## 2019-08-14 NOTE — ED PROVIDER NOTE - ATTENDING CONTRIBUTION TO CARE
I, Jennifer Cabot, MD, have performed a history and physical exam of the patient and discussed their management with the ACP.  I reviewed the PA's note and agree with the documented findings and plan of care. My medical decision making and observations are found above.    Cabot: 79M with complex PMH, here with pruritis and hives of all 4 extremities x 2d. Pt has RLE edema chronically, but was supposed to go for a RLE doppler US today. Denies sob, chest pain, abdominal pain, vomiting, throat swelling, tongue or lip swelling, dysuria or any other complaints.  HDS, NAD, MMM, eyes clear, oropharynx without lesions or edema, no uvular edema, lungs with mild crackles at bilateral bases, no stridor, heart sounds normal, abd soft, NT, ND, no CVAT, RLE with 2+edema (chronic), LLE normal, wwp, skin with erythema and hives in BLE from feet to the thighs, BUE from hands to mid-upper arms, chest, eyelids, cheeks, neuro: alert and oriented, no focal deficits.  No sign of anaphylaxis.  Will tx with steroids, H2B, benadryl, refer to allergy and derm.  Will also check RLE doppler. I, Jennifer Cabot, MD, have performed a history and physical exam of the patient and discussed their management with the ACP.  I reviewed the PA's note and agree with the documented findings and plan of care. My medical decision making and observations are found above.    Cabot: 79M with complex PMH, here with pruritis and hives of all 4 extremities x 2d.  Family reports no resolution of any lesions - once there, it has stayed and new lesions have formed.  Pt has RLE edema chronically, but was supposed to go for a RLE doppler US today. Denies sob, chest pain, abdominal pain, vomiting, throat swelling, tongue or lip swelling, dysuria or any other complaints.  HDS, NAD, MMM, eyes clear, oropharynx without lesions or edema, no uvular edema, lungs with mild crackles at bilateral bases, no stridor, heart sounds normal, abd soft, NT, ND, no CVAT, RLE with 2+edema (chronic), LLE normal, wwp, skin with erythema and hives in BLE from feet to the thighs, BUE from hands to mid-upper arms, chest, eyelids, cheeks, neuro: alert and oriented, no focal deficits.  No sign of anaphylaxis.  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  Will consult derm for a biopsy and also treat with steroids, H2B, benadryl, refer to allergy and derm.  Will also check RLE doppler. I, Jennifer Cabot, MD, have performed a history and physical exam of the patient and discussed their management with the ACP.  I reviewed the PA's note and agree with the documented findings and plan of care. My medical decision making and observations are found above.    Cabot: 79M with complex PMH, here with pruritis and hives of all 4 extremities x 2d.  Family reports no resolution of any lesions - once there, it has stayed and new lesions have formed.  Pt has RLE edema chronically, but was supposed to go for a RLE doppler US today. Denies sob, chest pain, abdominal pain, vomiting, throat swelling, tongue or lip swelling, dysuria or any other complaints.  HDS, NAD, MMM, eyes clear, oropharynx without lesions or edema, no uvular edema, lungs with mild crackles at bilateral bases, no stridor, heart sounds normal, abd soft, NT, ND, no CVAT, RLE with 2+edema (chronic), LLE normal, wwp, skin with erythema and hives in BLE from feet to the thighs, BUE from hands to mid-upper arms, chest, eyelids, cheeks, neuro: alert and oriented, no focal deficits.  No sign of anaphylaxis.  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  Will consult derm for a biopsy and also treat with steroids, H2B, benadryl, follow up with derm, rheum, allergy.  Will also check RLE doppler.

## 2019-08-14 NOTE — H&P ADULT - NSHPREVIEWOFSYSTEMS_GEN_ALL_CORE
GENERAL: No fever, no chills, no night sweats, no weight gain or loss  EYES: no change in vision, no blurred vision  HEENT: no trouble swallowing, no trouble speaking, no congestion, no sore throat  NECK: no pain or stiffness  CV: no chest pain or palpitations  RESP: no cough, no shortness of breath  GI: no abdominal pain, no nausea, no vomiting, no diarrhea, no constipation, no BRBPR or melena  : No dysuria, no frequency  NEURO: no headache, no weakness, no dizziness  SKIN: no rashes  HEME: No easy bruising or bleeding  MSK: No joint pain

## 2019-08-15 ENCOUNTER — TRANSCRIPTION ENCOUNTER (OUTPATIENT)
Age: 79
End: 2019-08-15

## 2019-08-15 VITALS
TEMPERATURE: 97 F | SYSTOLIC BLOOD PRESSURE: 131 MMHG | RESPIRATION RATE: 19 BRPM | HEART RATE: 73 BPM | OXYGEN SATURATION: 96 % | DIASTOLIC BLOOD PRESSURE: 71 MMHG

## 2019-08-15 LAB
ALBUMIN SERPL ELPH-MCNC: 4.2 G/DL — SIGNIFICANT CHANGE UP (ref 3.3–5)
ALP SERPL-CCNC: 68 U/L — SIGNIFICANT CHANGE UP (ref 40–120)
ALT FLD-CCNC: 25 U/L — SIGNIFICANT CHANGE UP (ref 4–41)
ANION GAP SERPL CALC-SCNC: 17 MMO/L — HIGH (ref 7–14)
AST SERPL-CCNC: 28 U/L — SIGNIFICANT CHANGE UP (ref 4–40)
BACTERIA UR CULT: SIGNIFICANT CHANGE UP
BASOPHILS # BLD AUTO: 0.07 K/UL — SIGNIFICANT CHANGE UP (ref 0–0.2)
BASOPHILS NFR BLD AUTO: 0.5 % — SIGNIFICANT CHANGE UP (ref 0–2)
BILIRUB SERPL-MCNC: 0.7 MG/DL — SIGNIFICANT CHANGE UP (ref 0.2–1.2)
BUN SERPL-MCNC: 17 MG/DL — SIGNIFICANT CHANGE UP (ref 7–23)
CALCIUM SERPL-MCNC: 9.1 MG/DL — SIGNIFICANT CHANGE UP (ref 8.4–10.5)
CHLORIDE SERPL-SCNC: 105 MMOL/L — SIGNIFICANT CHANGE UP (ref 98–107)
CO2 SERPL-SCNC: 20 MMOL/L — LOW (ref 22–31)
CREAT SERPL-MCNC: 1.95 MG/DL — HIGH (ref 0.5–1.3)
EOSINOPHIL # BLD AUTO: 0.13 K/UL — SIGNIFICANT CHANGE UP (ref 0–0.5)
EOSINOPHIL NFR BLD AUTO: 0.9 % — SIGNIFICANT CHANGE UP (ref 0–6)
GLUCOSE SERPL-MCNC: 115 MG/DL — HIGH (ref 70–99)
HCT VFR BLD CALC: 42.8 % — SIGNIFICANT CHANGE UP (ref 39–50)
HGB BLD-MCNC: 14.4 G/DL — SIGNIFICANT CHANGE UP (ref 13–17)
IMM GRANULOCYTES NFR BLD AUTO: 0.5 % — SIGNIFICANT CHANGE UP (ref 0–1.5)
LYMPHOCYTES # BLD AUTO: 16.6 % — SIGNIFICANT CHANGE UP (ref 13–44)
LYMPHOCYTES # BLD AUTO: 2.37 K/UL — SIGNIFICANT CHANGE UP (ref 1–3.3)
MAGNESIUM SERPL-MCNC: 2.3 MG/DL — SIGNIFICANT CHANGE UP (ref 1.6–2.6)
MCHC RBC-ENTMCNC: 30.7 PG — SIGNIFICANT CHANGE UP (ref 27–34)
MCHC RBC-ENTMCNC: 33.6 % — SIGNIFICANT CHANGE UP (ref 32–36)
MCV RBC AUTO: 91.3 FL — SIGNIFICANT CHANGE UP (ref 80–100)
MONOCYTES # BLD AUTO: 1.23 K/UL — HIGH (ref 0–0.9)
MONOCYTES NFR BLD AUTO: 8.6 % — SIGNIFICANT CHANGE UP (ref 2–14)
NEUTROPHILS # BLD AUTO: 10.38 K/UL — HIGH (ref 1.8–7.4)
NEUTROPHILS NFR BLD AUTO: 72.9 % — SIGNIFICANT CHANGE UP (ref 43–77)
NRBC # FLD: 0 K/UL — SIGNIFICANT CHANGE UP (ref 0–0)
PHOSPHATE SERPL-MCNC: 2.8 MG/DL — SIGNIFICANT CHANGE UP (ref 2.5–4.5)
PLATELET # BLD AUTO: 282 K/UL — SIGNIFICANT CHANGE UP (ref 150–400)
PMV BLD: 9 FL — SIGNIFICANT CHANGE UP (ref 7–13)
POTASSIUM SERPL-MCNC: 3.8 MMOL/L — SIGNIFICANT CHANGE UP (ref 3.5–5.3)
POTASSIUM SERPL-SCNC: 3.8 MMOL/L — SIGNIFICANT CHANGE UP (ref 3.5–5.3)
PROT SERPL-MCNC: 8.4 G/DL — HIGH (ref 6–8.3)
RBC # BLD: 4.69 M/UL — SIGNIFICANT CHANGE UP (ref 4.2–5.8)
RBC # FLD: 12.5 % — SIGNIFICANT CHANGE UP (ref 10.3–14.5)
SODIUM SERPL-SCNC: 142 MMOL/L — SIGNIFICANT CHANGE UP (ref 135–145)
SPECIMEN SOURCE: SIGNIFICANT CHANGE UP
WBC # BLD: 14.25 K/UL — HIGH (ref 3.8–10.5)
WBC # FLD AUTO: 14.25 K/UL — HIGH (ref 3.8–10.5)

## 2019-08-15 PROCEDURE — 99239 HOSP IP/OBS DSCHRG MGMT >30: CPT | Mod: GC

## 2019-08-15 RX ORDER — FUROSEMIDE 40 MG
1 TABLET ORAL
Qty: 0 | Refills: 0 | DISCHARGE

## 2019-08-15 RX ORDER — LANOLIN ALCOHOL/MO/W.PET/CERES
3 CREAM (GRAM) TOPICAL ONCE
Refills: 0 | Status: DISCONTINUED | OUTPATIENT
Start: 2019-08-15 | End: 2019-08-15

## 2019-08-15 RX ORDER — FUROSEMIDE 40 MG
1 TABLET ORAL
Qty: 26 | Refills: 0
Start: 2019-08-15 | End: 2019-10-13

## 2019-08-15 RX ORDER — ACETAMINOPHEN 500 MG
650 TABLET ORAL ONCE
Refills: 0 | Status: COMPLETED | OUTPATIENT
Start: 2019-08-15 | End: 2019-08-15

## 2019-08-15 RX ADMIN — Medication 81 MILLIGRAM(S): at 12:48

## 2019-08-15 RX ADMIN — AMLODIPINE BESYLATE 10 MILLIGRAM(S): 2.5 TABLET ORAL at 06:28

## 2019-08-15 RX ADMIN — Medication 40 MILLIGRAM(S): at 05:54

## 2019-08-15 RX ADMIN — Medication 1 APPLICATION(S): at 05:54

## 2019-08-15 RX ADMIN — FAMOTIDINE 20 MILLIGRAM(S): 10 INJECTION INTRAVENOUS at 12:48

## 2019-08-15 RX ADMIN — Medication 650 MILLIGRAM(S): at 06:57

## 2019-08-15 RX ADMIN — GABAPENTIN 100 MILLIGRAM(S): 400 CAPSULE ORAL at 05:54

## 2019-08-15 RX ADMIN — Medication 650 MILLIGRAM(S): at 05:54

## 2019-08-15 RX ADMIN — Medication 1 TABLET(S): at 12:48

## 2019-08-15 NOTE — PROGRESS NOTE ADULT - PROBLEM SELECTOR PLAN 1
- Rash on 4 extremities + chest + face, sun exposed areas.  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 PMLE/chronic actinic dermatitis  - Prednisone taper per Derm + triamcinolone cream BID to arms + legs x 2 weeks  - Patient to f/u in Derm clinic in 2-3 weeks

## 2019-08-15 NOTE — PROGRESS NOTE ADULT - PROBLEM SELECTOR PLAN 6
- Hold Exelon patch   - Continue gabapentin 100mg BID - Resume Exelon patch at home  - Continue gabapentin 100mg BID

## 2019-08-15 NOTE — PROGRESS NOTE ADULT - ATTENDING COMMENTS
Pt seen and examined by me Agree with resident  In brief this is a 79M PMH of  CKD 3, HTN, HLD, CAD s/p stent, and dementia, recent visit to [facility], returned 2 weeks back aw skin lesion, intermittent  pruritus  on  extremities, face, and chest along with new VITOR on  CKD stage 3.  clinical exam significant for dusky  erythematous,  coalescent papules plaques on face, around eyes, back of neck , extensor of extremities with relative sparing of covered areas Associated periorbital edema.   RLE swelling > RLE. 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

## 2019-08-15 NOTE — PROGRESS NOTE ADULT - SUBJECTIVE AND OBJECTIVE BOX
Patient is a 79y old  Male who presents with a chief complaint of Rash + VITOR (15 Aug 2019 12:00)      INTERVAL HPI/OVERNIGHT EVENTS:  Patient had a headache overnight that responded to 650mg tylenol.  Otherwise no acute events.  Patient looks improved with regards to his rash and significantly improved RLE edema.  Has no other complaints, would like to go home.  No CP, SOB, abdominal pain, fevers, nausea/vomiting/diarrhea/constipation.    T(C): 36.3 (08-15-19 @ 13:15), Max: 36.9 (08-15-19 @ 05:48)  HR: 73 (08-15-19 @ 13:15) (65 - 74)  BP: 131/71 (08-15-19 @ 13:15) (120/64 - 158/66)  RR: 19 (08-15-19 @ 13:15) (17 - 19)  SpO2: 96% (08-15-19 @ 13:15) (96% - 98%)  Wt(kg): --  I&O's Summary      REVIEW OF SYSTEMS:  GENERAL:  no fever, no chills,   EYES: no change in vision, no blurred vision  NECK: no pain or stiffness  CV: no chest pain or palpitations  RESP: no cough, no shortness of breath  GI: no abdominal pain, no nausea/vomiting, no diarrhea, no constipation, no melena or BRBPR  : no dysuria or increased frequency  NEURO: no headache, no weakness, no dizziness  SKIN: no rashes  HEME: no easy bruising or bleeding  MSK: no joint pain      PHYSICAL EXAM:  GENERAL: Lying in bed comfortably in NAD  HEAD:  Atraumatic, normocephalic  EYES: PERRL, EOMs intact b/l  ENMT: Moist Mucus membranes  NERVOUS SYSTEM:  A&Ox4, Normal Motor strenght in b/l Upper and lower extremities, gross sensation to light tough intact in b/l upper and lower extremities, CNs II-XII intact b/l w/out focal deficits  CHEST/LUNG: CTAB no w/r/r  HEART: RRR no m/g/r  ABDOMEN: +BS, soft, NT, ND  EXTREMITIES:  +2 radial pulses b/l, improved LE edema with only trace RLE edema to ankle.    LYMPH: No anterior/Posterior or supraclavicular LAD  SKIN: No new rashes or DTIs, warm, dry, and intact.  Continues to have chronic dermatitis changes on b/l LE.    PAST MEDICAL & SURGICAL HISTORY:  Ventral Hernia: without obstruction  Chronic Hypokalemia: x 3 yrs- on potassium supplement at home  History of Osteoarthritis  CKD (Chronic Kidney Disease): diagnosed 3 yrs ago- being followed by nephrologist  Hypercholesterolemia  HTN (Hypertension)  S/P coronary artery stent placement  History of Hemorrhoidectomy: &#x27;s      MEDICATIONS  (STANDING):  amLODIPine   Tablet 10 milliGRAM(s) Oral daily  aspirin enteric coated 81 milliGRAM(s) Oral daily  atorvastatin 40 milliGRAM(s) Oral at bedtime  famotidine    Tablet 20 milliGRAM(s) Oral daily  gabapentin 100 milliGRAM(s) Oral two times a day  lactated ringers. 1000 milliLiter(s) (100 mL/Hr) IV Continuous <Continuous>  multivitamin 1 Tablet(s) Oral daily  predniSONE   Tablet 40 milliGRAM(s) Oral daily  triamcinolone 0.1% Ointment 1 Application(s) Topical two times a day    MEDICATIONS  (PRN):  diphenhydrAMINE 25 milliGRAM(s) Oral daily PRN Rash and/or Itching      LABS:                        14.4   14.25 )-----------( 282      ( 15 Aug 2019 06:00 )             42.8     08-15    142  |  105  |  17  ----------------------------<  115<H>  3.8   |  20<L>  |  1.95<H>    Ca    9.1      15 Aug 2019 06:00  Phos  2.8     08-15  Mg     2.3     08-15    TPro  8.4<H>  /  Alb  4.2  /  TBili  0.7  /  DBili  x   /  AST  28  /  ALT  25  /  AlkPhos  68  08-15    LIVER FUNCTIONS - ( 15 Aug 2019 06:00 )  Alb: 4.2 g/dL / Pro: 8.4 g/dL / ALK PHOS: 68 u/L / ALT: 25 u/L / AST: 28 u/L / GGT: x     / T. Bili 0.7 mg/dL / D. Bili x           Urinalysis Basic - ( 14 Aug 2019 12:33 )    Color: COLORLESS / Appearance: CLEAR / S.004 / pH: 7.0  Gluc: NEGATIVE / Ketone: NEGATIVE  / Bili: NEGATIVE / Urobili: NORMAL   Blood: NEGATIVE / Protein: NEGATIVE / Nitrite: NEGATIVE   Leuk Esterase: NEGATIVE / RBC: x / WBC x   Sq Epi: x / Non Sq Epi: x / Bacteria: x      RADIOLOGY & ADDITIONAL TESTS:    Xray -   CT -  MRI -     Imaging Personally Reviewed:  [ ] YES  [ x] NO    Consultant(s) Notes Reviewed:  [x ] YES  [ ] NO    Care Discussed with Consultants/Other Providers [ x] YES  [ ] NO      - Derm

## 2019-08-15 NOTE — PROGRESS NOTE ADULT - PROBLEM SELECTOR PLAN 5
- Continue home Norvasc 10mg and Lisinopril 40mg - Continue home Norvasc 10mg, and resume Lisinopril 40mg

## 2019-08-15 NOTE — DISCHARGE NOTE PROVIDER - NSFOLLOWUPCLINICS_GEN_ALL_ED_FT
[facility] Dermatolgy  Dermatology  [address], [facility]  [place] [address]  Phone: [phone]  Fax:   Follow Up Time:

## 2019-08-15 NOTE — PROGRESS NOTE ADULT - PROBLEM SELECTOR PLAN 2
Baseline Cr ~1.42, currently 2.57   - Received 1L NS in ED, will evaluate response to determine pre-renal component   - Kidney and bladder u/s, urine lytes, urine Cr, urea, protein Baseline Cr ~1.42, currently 2.57   - Responsive to fluids.     - f/u with nephrologist Abhijeet

## 2019-08-15 NOTE — DISCHARGE NOTE PROVIDER - CARE PROVIDER_API CALL
Emiliano Jha)  Internal Medicine  [address] [place] [address]  Phone: [phone]  Fax: [phone]  Follow Up Time: Emiliano Jha)  Internal Medicine  [address] [place] [address]  Phone: [phone]  Fax: [phone]  Follow Up Time: 1 week

## 2019-08-15 NOTE — DISCHARGE NOTE PROVIDER - NSDCCPCAREPLAN_GEN_ALL_CORE_FT
PRINCIPAL DISCHARGE DIAGNOSIS  Diagnosis: Photoallergic dermatitis  Assessment and Plan of Treatment: You came to the ED with a rash, which our Dermatology team determined was most likely photoallergic dermatitis due to recent sun exposure and the characteristic pattern in sun-exposed areas.  This improved significantly after we started steroids.  Please continue Prednisone per the Dermatology recommendations, 40mg for 6 days then 30mg for 6 days, then 20mg for 6 days, then 10mg for 6 days.  You may also use the steroid triamcinolone ointment twice daily on your arms and legs.  Please call Dermatology clinic and schedule an appointment with them in 2-3 weeks.      SECONDARY DISCHARGE DIAGNOSES  Diagnosis: Acute kidney injury superimposed on CKD  Assessment and Plan of Treatment: Our lab work in the ED indicated that your kidneys were not functioning as well as usual.  This problem was treated with fluid administration, and improved.  Please follow-up with your nephrologist Dr. Jha for further work-up and discussion about your kidneys. PRINCIPAL DISCHARGE DIAGNOSIS  Diagnosis: Photoallergic dermatitis  Assessment and Plan of Treatment: You came to the ED with a rash, which our Dermatology team determined was most likely photoallergic dermatitis due to recent sun exposure and the characteristic pattern in sun-exposed areas.  This improved significantly after we started steroids.  Please continue Prednisone per the Dermatology recommendations, 40mg for 6 days then 30mg for 6 days, then 20mg for 6 days, then 10mg for 6 days.  You may also use the steroid triamcinolone ointment twice daily on your arms and legs.  Please call Dermatology clinic and schedule an appointment with them in 2-3 weeks.      SECONDARY DISCHARGE DIAGNOSES  Diagnosis: Acute kidney injury superimposed on CKD  Assessment and Plan of Treatment: Our lab work in the ED indicated that your kidneys were not functioning as well as usual.  This problem was treated with fluid administration, and improved.  Please follow-up with your nephrologist Dr. Jha for further work-up and discussion about your kidneys. Also, take Lasix 40 mg every other day; Monday, wednesday, and friday.

## 2019-08-15 NOTE — PROGRESS NOTE ADULT - PROBLEM SELECTOR PLAN 4
- Home dose lasix 40mg oral  - Will be cautious initially with fluids, given unclear kidney pathology and history of edema.  Re-evaluate after repeat BMP and response - Home dose lasix 40mg oral, with resume outpatient MWF

## 2019-08-15 NOTE — DISCHARGE NOTE NURSING/CASE MANAGEMENT/SOCIAL WORK - NSDCDPATPORTLINK_GEN_ALL_CORE
used You can access the [facility] Patient Portal, offered by [facility], by registering with the following website: [email or web address]

## 2019-08-15 NOTE — DISCHARGE NOTE PROVIDER - NSDCFUADDAPPT_GEN_ALL_CORE_FT
Please follow-up with dermatology in 2-3 weeks.  Please schedule an appointment with Dr. Jha in 1-2 weeks as available.

## 2019-08-15 NOTE — DISCHARGE NOTE PROVIDER - HOSPITAL COURSE
Patient is an 79M with history of CKD with chronic hypokalemia, osteoarthritis, HTN, HLD, ventral hernia, CAD s/p stent x1, and mild cognitive impairment who presented to our ED with a erythematous rash of 2 day duration on his face, all four extremities, and chest along his shirt line.  He was evaluated by our Dermatology service and found to have photoallergic dermatitis, likely on top of some chronic dermitis in his lower extremities likely secondary to venous stasis.          He also had unilateral swelling of his RLE and pain behind his right calf, which was somewhat chronic and intermittent on history, and was evaluated with a RLE duplex ultrasound which was negative for DVT.          Labwork in the ED was notable for a significant increase in his serum creatinine level to 2.57, increased from his baseline of approximately 1.4 last measured July 2018.      He also had an ultrasound of his kidneys and bladder which found no hydronephrosis or bladder obstruction with low volume after voiding.  He received fluids due to suspicion of pre-renal nature of this acute kidney injury, and there was significant improvement with Cr downtrending to 2.39 and then 1.95 the morning of 8/15.  He has close follow-up scheduled with his outpatient nephrologist and this was determined to be the best source for workup of his chronic kidney issues.

## 2020-11-24 ENCOUNTER — APPOINTMENT (OUTPATIENT)
Dept: UROLOGY | Facility: CLINIC | Age: 80
End: 2020-11-24

## 2021-02-10 NOTE — PATIENT PROFILE ADULT - NSPROMUTPARTICIPCAREFT_GEN_A_NUR
Medication:    Outpatient Medications Marked as Taking for the 2/10/21 encounter (Refill) with [facility]   Medication Sig Dispense Refill   • sertraline (ZOLOFT) 50 MG tablet TAKE 1 AND 1/2 TABLETS BY MOUTH WITH 100MG TABLET FOR 175MG DAILY, needs to keep scheduled appointment for further refills 45 tablet 0       Message to Prescriber:     [x] Pharmacy has been verified.    [] Patient completely out of medication (*Route encounter as high priority if checked)    [] Patient informed refill request can take up to 3 business days to be processed    No future appointment-PSAR left voicemail for patient to contact the clinic to schedule a follow up       no

## 2022-06-08 NOTE — PROGRESS NOTE ADULT - PROBLEM SELECTOR PROBLEM 3
Urology- [phone]    Audiology [phone]   Acute renal failure with other specified pathological kidney lesion superimposed on stage 3 chronic kidney disease

## 2023-07-19 NOTE — PROGRESS NOTE ADULT - PROBLEM/PLAN-5
Pt provided AVS and discharge instructions reviewed. Pt declines any futher examination. Pt awaiting a ride from [place] transport.   DISPLAY PLAN FREE TEXT

## 2025-07-25 NOTE — PATIENT PROFILE ADULT - DOES PATIENT HAVE ADVANCE DIRECTIVE
CARDIAC REHAB NOTE  Patient Name: Houston Pérez  Patient : 1940      Patient attended cardiac rehab exercise session today and tolerated well with no complaints.  Reports taking all medication as directed and denies chest pain at entry. Further documentation can be found scanned into the electronic medical record after completion of the session.    no